# Patient Record
Sex: MALE | Race: WHITE | NOT HISPANIC OR LATINO | Employment: OTHER | URBAN - METROPOLITAN AREA
[De-identification: names, ages, dates, MRNs, and addresses within clinical notes are randomized per-mention and may not be internally consistent; named-entity substitution may affect disease eponyms.]

---

## 2020-10-13 ENCOUNTER — HOSPITAL ENCOUNTER (OUTPATIENT)
Dept: LAB | Facility: MEDICAL CENTER | Age: 75
End: 2020-10-13
Attending: INTERNAL MEDICINE
Payer: MEDICARE

## 2020-10-13 PROCEDURE — 80053 COMPREHEN METABOLIC PANEL: CPT

## 2020-10-13 PROCEDURE — 84153 ASSAY OF PSA TOTAL: CPT

## 2020-10-13 PROCEDURE — 84100 ASSAY OF PHOSPHORUS: CPT

## 2020-10-13 PROCEDURE — 83735 ASSAY OF MAGNESIUM: CPT

## 2020-10-14 LAB
ALBUMIN SERPL BCP-MCNC: 4.3 G/DL (ref 3.2–4.9)
ALBUMIN/GLOB SERPL: 1.7 G/DL
ALP SERPL-CCNC: 250 U/L (ref 30–99)
ALT SERPL-CCNC: 18 U/L (ref 2–50)
ANION GAP SERPL CALC-SCNC: 8 MMOL/L (ref 7–16)
AST SERPL-CCNC: 16 U/L (ref 12–45)
BILIRUB SERPL-MCNC: 0.7 MG/DL (ref 0.1–1.5)
BUN SERPL-MCNC: 25 MG/DL (ref 8–22)
CALCIUM SERPL-MCNC: 9.3 MG/DL (ref 8.5–10.5)
CHLORIDE SERPL-SCNC: 98 MMOL/L (ref 96–112)
CO2 SERPL-SCNC: 28 MMOL/L (ref 20–33)
CREAT SERPL-MCNC: 0.98 MG/DL (ref 0.5–1.4)
GLOBULIN SER CALC-MCNC: 2.5 G/DL (ref 1.9–3.5)
GLUCOSE SERPL-MCNC: 100 MG/DL (ref 65–99)
MAGNESIUM SERPL-MCNC: 1.8 MG/DL (ref 1.5–2.5)
PHOSPHATE SERPL-MCNC: 4.1 MG/DL (ref 2.5–4.5)
POTASSIUM SERPL-SCNC: 4.2 MMOL/L (ref 3.6–5.5)
PROT SERPL-MCNC: 6.8 G/DL (ref 6–8.2)
PSA SERPL-MCNC: 40.1 NG/ML (ref 0–4)
SODIUM SERPL-SCNC: 134 MMOL/L (ref 135–145)

## 2020-10-15 ENCOUNTER — HOSPITAL ENCOUNTER (OUTPATIENT)
Dept: RADIOLOGY | Facility: MEDICAL CENTER | Age: 75
End: 2020-10-15
Attending: INTERNAL MEDICINE
Payer: MEDICARE

## 2020-10-15 DIAGNOSIS — C61 MALIGNANT NEOPLASM OF PROSTATE (HCC): ICD-10-CM

## 2020-10-15 PROCEDURE — 700117 HCHG RX CONTRAST REV CODE 255: Performed by: INTERNAL MEDICINE

## 2020-10-15 PROCEDURE — 71260 CT THORAX DX C+: CPT

## 2020-10-15 RX ADMIN — IOHEXOL 100 ML: 350 INJECTION, SOLUTION INTRAVENOUS at 17:00

## 2020-10-15 RX ADMIN — IOHEXOL 25 ML: 240 INJECTION, SOLUTION INTRATHECAL; INTRAVASCULAR; INTRAVENOUS; ORAL at 17:00

## 2020-10-29 ENCOUNTER — HOSPITAL ENCOUNTER (OUTPATIENT)
Dept: LAB | Facility: MEDICAL CENTER | Age: 75
End: 2020-10-29
Attending: NURSE PRACTITIONER
Payer: MEDICARE

## 2020-10-29 PROCEDURE — 84153 ASSAY OF PSA TOTAL: CPT

## 2020-10-29 PROCEDURE — 80053 COMPREHEN METABOLIC PANEL: CPT

## 2020-10-29 PROCEDURE — 84100 ASSAY OF PHOSPHORUS: CPT

## 2020-10-29 PROCEDURE — 36415 COLL VENOUS BLD VENIPUNCTURE: CPT

## 2020-10-29 PROCEDURE — 83735 ASSAY OF MAGNESIUM: CPT

## 2020-10-30 LAB
ALBUMIN SERPL BCP-MCNC: 4.3 G/DL (ref 3.2–4.9)
ALBUMIN/GLOB SERPL: 1.7 G/DL
ALP SERPL-CCNC: 197 U/L (ref 30–99)
ALT SERPL-CCNC: 18 U/L (ref 2–50)
ANION GAP SERPL CALC-SCNC: 12 MMOL/L (ref 7–16)
AST SERPL-CCNC: 18 U/L (ref 12–45)
BILIRUB SERPL-MCNC: 0.9 MG/DL (ref 0.1–1.5)
BUN SERPL-MCNC: 16 MG/DL (ref 8–22)
CALCIUM SERPL-MCNC: 8.6 MG/DL (ref 8.5–10.5)
CHLORIDE SERPL-SCNC: 98 MMOL/L (ref 96–112)
CO2 SERPL-SCNC: 25 MMOL/L (ref 20–33)
CREAT SERPL-MCNC: 0.71 MG/DL (ref 0.5–1.4)
GLOBULIN SER CALC-MCNC: 2.6 G/DL (ref 1.9–3.5)
GLUCOSE SERPL-MCNC: 109 MG/DL (ref 65–99)
MAGNESIUM SERPL-MCNC: 2 MG/DL (ref 1.5–2.5)
PHOSPHATE SERPL-MCNC: 2.8 MG/DL (ref 2.5–4.5)
POTASSIUM SERPL-SCNC: 4 MMOL/L (ref 3.6–5.5)
PROT SERPL-MCNC: 6.9 G/DL (ref 6–8.2)
PSA SERPL-MCNC: 31.3 NG/ML (ref 0–4)
SODIUM SERPL-SCNC: 135 MMOL/L (ref 135–145)

## 2020-11-12 ENCOUNTER — HOSPITAL ENCOUNTER (OUTPATIENT)
Dept: LAB | Facility: MEDICAL CENTER | Age: 75
End: 2020-11-12
Attending: NURSE PRACTITIONER
Payer: MEDICARE

## 2020-11-12 LAB
ALBUMIN SERPL BCP-MCNC: 4.2 G/DL (ref 3.2–4.9)
ALBUMIN/GLOB SERPL: 1.7 G/DL
ALP SERPL-CCNC: 134 U/L (ref 30–99)
ALT SERPL-CCNC: 16 U/L (ref 2–50)
ANION GAP SERPL CALC-SCNC: 9 MMOL/L (ref 7–16)
AST SERPL-CCNC: 14 U/L (ref 12–45)
BILIRUB SERPL-MCNC: 0.7 MG/DL (ref 0.1–1.5)
BUN SERPL-MCNC: 17 MG/DL (ref 8–22)
CALCIUM SERPL-MCNC: 9.6 MG/DL (ref 8.5–10.5)
CHLORIDE SERPL-SCNC: 99 MMOL/L (ref 96–112)
CO2 SERPL-SCNC: 28 MMOL/L (ref 20–33)
CREAT SERPL-MCNC: 0.87 MG/DL (ref 0.5–1.4)
GLOBULIN SER CALC-MCNC: 2.5 G/DL (ref 1.9–3.5)
GLUCOSE SERPL-MCNC: 105 MG/DL (ref 65–99)
MAGNESIUM SERPL-MCNC: 2 MG/DL (ref 1.5–2.5)
PHOSPHATE SERPL-MCNC: 3.5 MG/DL (ref 2.5–4.5)
POTASSIUM SERPL-SCNC: 3.8 MMOL/L (ref 3.6–5.5)
PROT SERPL-MCNC: 6.7 G/DL (ref 6–8.2)
PSA SERPL-MCNC: 24.5 NG/ML (ref 0–4)
SODIUM SERPL-SCNC: 136 MMOL/L (ref 135–145)

## 2020-11-12 PROCEDURE — 84153 ASSAY OF PSA TOTAL: CPT

## 2020-11-12 PROCEDURE — 84100 ASSAY OF PHOSPHORUS: CPT

## 2020-11-12 PROCEDURE — 36415 COLL VENOUS BLD VENIPUNCTURE: CPT

## 2020-11-12 PROCEDURE — 83735 ASSAY OF MAGNESIUM: CPT

## 2020-11-12 PROCEDURE — 80053 COMPREHEN METABOLIC PANEL: CPT

## 2020-12-02 ENCOUNTER — HOSPITAL ENCOUNTER (OUTPATIENT)
Dept: LAB | Facility: MEDICAL CENTER | Age: 75
End: 2020-12-02
Attending: NURSE PRACTITIONER
Payer: MEDICARE

## 2020-12-02 LAB
ALBUMIN SERPL BCP-MCNC: 4.1 G/DL (ref 3.2–4.9)
ALBUMIN/GLOB SERPL: 1.8 G/DL
ALP SERPL-CCNC: 95 U/L (ref 30–99)
ALT SERPL-CCNC: 14 U/L (ref 2–50)
ANION GAP SERPL CALC-SCNC: 9 MMOL/L (ref 7–16)
AST SERPL-CCNC: 13 U/L (ref 12–45)
BILIRUB SERPL-MCNC: 0.7 MG/DL (ref 0.1–1.5)
BUN SERPL-MCNC: 15 MG/DL (ref 8–22)
CALCIUM SERPL-MCNC: 8.9 MG/DL (ref 8.5–10.5)
CHLORIDE SERPL-SCNC: 100 MMOL/L (ref 96–112)
CO2 SERPL-SCNC: 27 MMOL/L (ref 20–33)
CREAT SERPL-MCNC: 0.62 MG/DL (ref 0.5–1.4)
FASTING STATUS PATIENT QL REPORTED: NORMAL
GLOBULIN SER CALC-MCNC: 2.3 G/DL (ref 1.9–3.5)
GLUCOSE SERPL-MCNC: 119 MG/DL (ref 65–99)
MAGNESIUM SERPL-MCNC: 1.7 MG/DL (ref 1.5–2.5)
PHOSPHATE SERPL-MCNC: 3.2 MG/DL (ref 2.5–4.5)
POTASSIUM SERPL-SCNC: 3.9 MMOL/L (ref 3.6–5.5)
PROT SERPL-MCNC: 6.4 G/DL (ref 6–8.2)
PSA SERPL-MCNC: 20.7 NG/ML (ref 0–4)
SODIUM SERPL-SCNC: 136 MMOL/L (ref 135–145)

## 2020-12-02 PROCEDURE — 84100 ASSAY OF PHOSPHORUS: CPT

## 2020-12-02 PROCEDURE — 36415 COLL VENOUS BLD VENIPUNCTURE: CPT

## 2020-12-02 PROCEDURE — 84153 ASSAY OF PSA TOTAL: CPT

## 2020-12-02 PROCEDURE — 80053 COMPREHEN METABOLIC PANEL: CPT

## 2020-12-02 PROCEDURE — 83735 ASSAY OF MAGNESIUM: CPT

## 2020-12-16 ENCOUNTER — HOSPITAL ENCOUNTER (OUTPATIENT)
Dept: LAB | Facility: MEDICAL CENTER | Age: 75
End: 2020-12-16
Attending: NURSE PRACTITIONER
Payer: MEDICARE

## 2020-12-16 LAB
ALBUMIN SERPL BCP-MCNC: 4.5 G/DL (ref 3.2–4.9)
ALBUMIN/GLOB SERPL: 1.9 G/DL
ALP SERPL-CCNC: 85 U/L (ref 30–99)
ALT SERPL-CCNC: 15 U/L (ref 2–50)
ANION GAP SERPL CALC-SCNC: 11 MMOL/L (ref 7–16)
AST SERPL-CCNC: 14 U/L (ref 12–45)
BILIRUB SERPL-MCNC: 0.7 MG/DL (ref 0.1–1.5)
BUN SERPL-MCNC: 26 MG/DL (ref 8–22)
CALCIUM SERPL-MCNC: 9.7 MG/DL (ref 8.5–10.5)
CHLORIDE SERPL-SCNC: 101 MMOL/L (ref 96–112)
CO2 SERPL-SCNC: 27 MMOL/L (ref 20–33)
CREAT SERPL-MCNC: 0.97 MG/DL (ref 0.5–1.4)
GLOBULIN SER CALC-MCNC: 2.4 G/DL (ref 1.9–3.5)
GLUCOSE SERPL-MCNC: 99 MG/DL (ref 65–99)
MAGNESIUM SERPL-MCNC: 1.6 MG/DL (ref 1.5–2.5)
PHOSPHATE SERPL-MCNC: 3.9 MG/DL (ref 2.5–4.5)
POTASSIUM SERPL-SCNC: 3.9 MMOL/L (ref 3.6–5.5)
PROT SERPL-MCNC: 6.9 G/DL (ref 6–8.2)
SODIUM SERPL-SCNC: 139 MMOL/L (ref 135–145)

## 2020-12-16 PROCEDURE — 83735 ASSAY OF MAGNESIUM: CPT

## 2020-12-16 PROCEDURE — 84153 ASSAY OF PSA TOTAL: CPT

## 2020-12-16 PROCEDURE — 84100 ASSAY OF PHOSPHORUS: CPT

## 2020-12-16 PROCEDURE — 80053 COMPREHEN METABOLIC PANEL: CPT

## 2020-12-16 PROCEDURE — 36415 COLL VENOUS BLD VENIPUNCTURE: CPT

## 2020-12-17 LAB — PSA SERPL-MCNC: 19.3 NG/ML (ref 0–4)

## 2021-01-06 ENCOUNTER — HOSPITAL ENCOUNTER (OUTPATIENT)
Dept: LAB | Facility: MEDICAL CENTER | Age: 76
End: 2021-01-06
Attending: NURSE PRACTITIONER
Payer: MEDICARE

## 2021-01-06 LAB
ALBUMIN SERPL BCP-MCNC: 4.2 G/DL (ref 3.2–4.9)
ALBUMIN/GLOB SERPL: 1.8 G/DL
ALP SERPL-CCNC: 80 U/L (ref 30–99)
ALT SERPL-CCNC: 14 U/L (ref 2–50)
ANION GAP SERPL CALC-SCNC: 8 MMOL/L (ref 7–16)
AST SERPL-CCNC: 13 U/L (ref 12–45)
BILIRUB SERPL-MCNC: 0.8 MG/DL (ref 0.1–1.5)
BUN SERPL-MCNC: 15 MG/DL (ref 8–22)
CALCIUM SERPL-MCNC: 9.5 MG/DL (ref 8.5–10.5)
CHLORIDE SERPL-SCNC: 98 MMOL/L (ref 96–112)
CO2 SERPL-SCNC: 29 MMOL/L (ref 20–33)
CREAT SERPL-MCNC: 0.82 MG/DL (ref 0.5–1.4)
GLOBULIN SER CALC-MCNC: 2.4 G/DL (ref 1.9–3.5)
GLUCOSE SERPL-MCNC: 148 MG/DL (ref 65–99)
MAGNESIUM SERPL-MCNC: 1.8 MG/DL (ref 1.5–2.5)
PHOSPHATE SERPL-MCNC: 2.7 MG/DL (ref 2.5–4.5)
POTASSIUM SERPL-SCNC: 3.7 MMOL/L (ref 3.6–5.5)
PROT SERPL-MCNC: 6.6 G/DL (ref 6–8.2)
PSA SERPL-MCNC: 19.1 NG/ML (ref 0–4)
SODIUM SERPL-SCNC: 135 MMOL/L (ref 135–145)

## 2021-01-06 PROCEDURE — 83735 ASSAY OF MAGNESIUM: CPT

## 2021-01-06 PROCEDURE — 80053 COMPREHEN METABOLIC PANEL: CPT

## 2021-01-06 PROCEDURE — 84153 ASSAY OF PSA TOTAL: CPT

## 2021-01-06 PROCEDURE — 36415 COLL VENOUS BLD VENIPUNCTURE: CPT

## 2021-01-06 PROCEDURE — 84100 ASSAY OF PHOSPHORUS: CPT

## 2021-01-12 DIAGNOSIS — Z23 NEED FOR VACCINATION: ICD-10-CM

## 2021-01-13 ENCOUNTER — HOSPITAL ENCOUNTER (OUTPATIENT)
Dept: LAB | Facility: MEDICAL CENTER | Age: 76
End: 2021-01-13
Attending: NURSE PRACTITIONER
Payer: MEDICARE

## 2021-01-13 LAB
ALBUMIN SERPL BCP-MCNC: 4.3 G/DL (ref 3.2–4.9)
ALBUMIN/GLOB SERPL: 1.8 G/DL
ALP SERPL-CCNC: 81 U/L (ref 30–99)
ALT SERPL-CCNC: 16 U/L (ref 2–50)
ANION GAP SERPL CALC-SCNC: 9 MMOL/L (ref 7–16)
AST SERPL-CCNC: 16 U/L (ref 12–45)
BILIRUB SERPL-MCNC: 0.8 MG/DL (ref 0.1–1.5)
BUN SERPL-MCNC: 21 MG/DL (ref 8–22)
CALCIUM SERPL-MCNC: 9 MG/DL (ref 8.5–10.5)
CHLORIDE SERPL-SCNC: 100 MMOL/L (ref 96–112)
CO2 SERPL-SCNC: 29 MMOL/L (ref 20–33)
CREAT SERPL-MCNC: 0.76 MG/DL (ref 0.5–1.4)
GLOBULIN SER CALC-MCNC: 2.4 G/DL (ref 1.9–3.5)
GLUCOSE SERPL-MCNC: 116 MG/DL (ref 65–99)
MAGNESIUM SERPL-MCNC: 1.7 MG/DL (ref 1.5–2.5)
PHOSPHATE SERPL-MCNC: 3.6 MG/DL (ref 2.5–4.5)
POTASSIUM SERPL-SCNC: 4.2 MMOL/L (ref 3.6–5.5)
PROT SERPL-MCNC: 6.7 G/DL (ref 6–8.2)
PSA SERPL-MCNC: 18.8 NG/ML (ref 0–4)
SODIUM SERPL-SCNC: 138 MMOL/L (ref 135–145)

## 2021-01-13 PROCEDURE — 36415 COLL VENOUS BLD VENIPUNCTURE: CPT

## 2021-01-13 PROCEDURE — 84153 ASSAY OF PSA TOTAL: CPT

## 2021-01-13 PROCEDURE — 83735 ASSAY OF MAGNESIUM: CPT

## 2021-01-13 PROCEDURE — 84100 ASSAY OF PHOSPHORUS: CPT

## 2021-01-13 PROCEDURE — 80053 COMPREHEN METABOLIC PANEL: CPT

## 2021-02-10 ENCOUNTER — IMMUNIZATION (OUTPATIENT)
Dept: FAMILY PLANNING/WOMEN'S HEALTH CLINIC | Facility: IMMUNIZATION CENTER | Age: 76
End: 2021-02-10
Attending: INTERNAL MEDICINE
Payer: MEDICARE

## 2021-02-10 DIAGNOSIS — Z23 ENCOUNTER FOR VACCINATION: Primary | ICD-10-CM

## 2021-02-10 DIAGNOSIS — Z23 NEED FOR VACCINATION: ICD-10-CM

## 2021-02-10 PROCEDURE — 91300 PFIZER SARS-COV-2 VACCINE: CPT | Performed by: INTERNAL MEDICINE

## 2021-02-10 PROCEDURE — 0001A PFIZER SARS-COV-2 VACCINE: CPT | Performed by: INTERNAL MEDICINE

## 2021-03-03 ENCOUNTER — HOSPITAL ENCOUNTER (OUTPATIENT)
Dept: LAB | Facility: MEDICAL CENTER | Age: 76
End: 2021-03-03
Attending: NURSE PRACTITIONER
Payer: MEDICARE

## 2021-03-03 LAB
ALBUMIN SERPL BCP-MCNC: 3.9 G/DL (ref 3.2–4.9)
ALBUMIN/GLOB SERPL: 1.4 G/DL
ALP SERPL-CCNC: 66 U/L (ref 30–99)
ALT SERPL-CCNC: 16 U/L (ref 2–50)
ANION GAP SERPL CALC-SCNC: 11 MMOL/L (ref 7–16)
AST SERPL-CCNC: 20 U/L (ref 12–45)
BILIRUB SERPL-MCNC: 0.7 MG/DL (ref 0.1–1.5)
BUN SERPL-MCNC: 11 MG/DL (ref 8–22)
CALCIUM SERPL-MCNC: 9.2 MG/DL (ref 8.5–10.5)
CHLORIDE SERPL-SCNC: 97 MMOL/L (ref 96–112)
CO2 SERPL-SCNC: 27 MMOL/L (ref 20–33)
CREAT SERPL-MCNC: 0.69 MG/DL (ref 0.5–1.4)
GLOBULIN SER CALC-MCNC: 2.7 G/DL (ref 1.9–3.5)
GLUCOSE SERPL-MCNC: 129 MG/DL (ref 65–99)
MAGNESIUM SERPL-MCNC: 1.8 MG/DL (ref 1.5–2.5)
PHOSPHATE SERPL-MCNC: 2.7 MG/DL (ref 2.5–4.5)
POTASSIUM SERPL-SCNC: 3.8 MMOL/L (ref 3.6–5.5)
PROT SERPL-MCNC: 6.6 G/DL (ref 6–8.2)
PSA SERPL-MCNC: 21.3 NG/ML (ref 0–4)
SODIUM SERPL-SCNC: 135 MMOL/L (ref 135–145)

## 2021-03-03 PROCEDURE — 84153 ASSAY OF PSA TOTAL: CPT

## 2021-03-03 PROCEDURE — 36415 COLL VENOUS BLD VENIPUNCTURE: CPT

## 2021-03-03 PROCEDURE — 84100 ASSAY OF PHOSPHORUS: CPT

## 2021-03-03 PROCEDURE — 83735 ASSAY OF MAGNESIUM: CPT

## 2021-03-03 PROCEDURE — 80053 COMPREHEN METABOLIC PANEL: CPT

## 2021-03-10 ENCOUNTER — IMMUNIZATION (OUTPATIENT)
Dept: FAMILY PLANNING/WOMEN'S HEALTH CLINIC | Facility: IMMUNIZATION CENTER | Age: 76
End: 2021-03-10
Attending: INTERNAL MEDICINE
Payer: MEDICARE

## 2021-03-10 DIAGNOSIS — Z23 ENCOUNTER FOR VACCINATION: Primary | ICD-10-CM

## 2021-03-10 PROCEDURE — 91300 PFIZER SARS-COV-2 VACCINE: CPT | Performed by: NURSE PRACTITIONER

## 2021-03-10 PROCEDURE — 0002A PFIZER SARS-COV-2 VACCINE: CPT | Performed by: NURSE PRACTITIONER

## 2021-03-31 ENCOUNTER — HOSPITAL ENCOUNTER (OUTPATIENT)
Dept: LAB | Facility: MEDICAL CENTER | Age: 76
End: 2021-03-31
Attending: NURSE PRACTITIONER
Payer: MEDICARE

## 2021-03-31 LAB
ALBUMIN SERPL BCP-MCNC: 4.2 G/DL (ref 3.2–4.9)
ALBUMIN/GLOB SERPL: 1.8 G/DL
ALP SERPL-CCNC: 63 U/L (ref 30–99)
ALT SERPL-CCNC: 16 U/L (ref 2–50)
ANION GAP SERPL CALC-SCNC: 9 MMOL/L (ref 7–16)
AST SERPL-CCNC: 19 U/L (ref 12–45)
BILIRUB SERPL-MCNC: 0.5 MG/DL (ref 0.1–1.5)
BUN SERPL-MCNC: 15 MG/DL (ref 8–22)
CALCIUM SERPL-MCNC: 9.1 MG/DL (ref 8.5–10.5)
CHLORIDE SERPL-SCNC: 98 MMOL/L (ref 96–112)
CO2 SERPL-SCNC: 26 MMOL/L (ref 20–33)
CREAT SERPL-MCNC: 0.59 MG/DL (ref 0.5–1.4)
GLOBULIN SER CALC-MCNC: 2.3 G/DL (ref 1.9–3.5)
GLUCOSE SERPL-MCNC: 156 MG/DL (ref 65–99)
MAGNESIUM SERPL-MCNC: 1.7 MG/DL (ref 1.5–2.5)
PHOSPHATE SERPL-MCNC: 3.2 MG/DL (ref 2.5–4.5)
POTASSIUM SERPL-SCNC: 4.1 MMOL/L (ref 3.6–5.5)
PROT SERPL-MCNC: 6.5 G/DL (ref 6–8.2)
PSA SERPL-MCNC: 20.5 NG/ML (ref 0–4)
SODIUM SERPL-SCNC: 133 MMOL/L (ref 135–145)

## 2021-03-31 PROCEDURE — 80053 COMPREHEN METABOLIC PANEL: CPT

## 2021-03-31 PROCEDURE — 84153 ASSAY OF PSA TOTAL: CPT

## 2021-03-31 PROCEDURE — 83735 ASSAY OF MAGNESIUM: CPT

## 2021-03-31 PROCEDURE — 84100 ASSAY OF PHOSPHORUS: CPT

## 2021-03-31 PROCEDURE — 36415 COLL VENOUS BLD VENIPUNCTURE: CPT

## 2021-04-29 ENCOUNTER — HOSPITAL ENCOUNTER (OUTPATIENT)
Dept: LAB | Facility: MEDICAL CENTER | Age: 76
End: 2021-04-29
Attending: INTERNAL MEDICINE
Payer: MEDICARE

## 2021-04-29 ENCOUNTER — APPOINTMENT (OUTPATIENT)
Dept: LAB | Facility: MEDICAL CENTER | Age: 76
End: 2021-04-29
Attending: NURSE PRACTITIONER
Payer: MEDICARE

## 2021-04-29 LAB
ALBUMIN SERPL BCP-MCNC: 3.9 G/DL (ref 3.2–4.9)
ALBUMIN/GLOB SERPL: 1.4 G/DL
ALP SERPL-CCNC: 77 U/L (ref 30–99)
ALT SERPL-CCNC: 15 U/L (ref 2–50)
ANION GAP SERPL CALC-SCNC: 9 MMOL/L (ref 7–16)
AST SERPL-CCNC: 14 U/L (ref 12–45)
BASOPHILS # BLD AUTO: 0.8 % (ref 0–1.8)
BASOPHILS # BLD: 0.05 K/UL (ref 0–0.12)
BILIRUB SERPL-MCNC: 0.7 MG/DL (ref 0.1–1.5)
BUN SERPL-MCNC: 16 MG/DL (ref 8–22)
CALCIUM SERPL-MCNC: 9.1 MG/DL (ref 8.5–10.5)
CHLORIDE SERPL-SCNC: 98 MMOL/L (ref 96–112)
CO2 SERPL-SCNC: 28 MMOL/L (ref 20–33)
CREAT SERPL-MCNC: 0.82 MG/DL (ref 0.5–1.4)
EOSINOPHIL # BLD AUTO: 0.13 K/UL (ref 0–0.51)
EOSINOPHIL NFR BLD: 2.2 % (ref 0–6.9)
ERYTHROCYTE [DISTWIDTH] IN BLOOD BY AUTOMATED COUNT: 43.6 FL (ref 35.9–50)
GLOBULIN SER CALC-MCNC: 2.7 G/DL (ref 1.9–3.5)
GLUCOSE SERPL-MCNC: 103 MG/DL (ref 65–99)
HCT VFR BLD AUTO: 39 % (ref 42–52)
HGB BLD-MCNC: 13.3 G/DL (ref 14–18)
IMM GRANULOCYTES # BLD AUTO: 0.02 K/UL (ref 0–0.11)
IMM GRANULOCYTES NFR BLD AUTO: 0.3 % (ref 0–0.9)
LYMPHOCYTES # BLD AUTO: 1.23 K/UL (ref 1–4.8)
LYMPHOCYTES NFR BLD: 20.4 % (ref 22–41)
MAGNESIUM SERPL-MCNC: 1.7 MG/DL (ref 1.5–2.5)
MCH RBC QN AUTO: 33.8 PG (ref 27–33)
MCHC RBC AUTO-ENTMCNC: 34.1 G/DL (ref 33.7–35.3)
MCV RBC AUTO: 99 FL (ref 81.4–97.8)
MONOCYTES # BLD AUTO: 0.54 K/UL (ref 0–0.85)
MONOCYTES NFR BLD AUTO: 9 % (ref 0–13.4)
NEUTROPHILS # BLD AUTO: 4.06 K/UL (ref 1.82–7.42)
NEUTROPHILS NFR BLD: 67.3 % (ref 44–72)
NRBC # BLD AUTO: 0 K/UL
NRBC BLD-RTO: 0 /100 WBC
PHOSPHATE SERPL-MCNC: 3.5 MG/DL (ref 2.5–4.5)
PLATELET # BLD AUTO: 222 K/UL (ref 164–446)
PMV BLD AUTO: 10.3 FL (ref 9–12.9)
POTASSIUM SERPL-SCNC: 4.2 MMOL/L (ref 3.6–5.5)
PROT SERPL-MCNC: 6.6 G/DL (ref 6–8.2)
PSA SERPL-MCNC: 24.3 NG/ML (ref 0–4)
RBC # BLD AUTO: 3.94 M/UL (ref 4.7–6.1)
SODIUM SERPL-SCNC: 135 MMOL/L (ref 135–145)
WBC # BLD AUTO: 6 K/UL (ref 4.8–10.8)

## 2021-04-29 PROCEDURE — 36415 COLL VENOUS BLD VENIPUNCTURE: CPT

## 2021-04-29 PROCEDURE — 84100 ASSAY OF PHOSPHORUS: CPT

## 2021-04-29 PROCEDURE — 80053 COMPREHEN METABOLIC PANEL: CPT

## 2021-04-29 PROCEDURE — 84153 ASSAY OF PSA TOTAL: CPT

## 2021-04-29 PROCEDURE — 83735 ASSAY OF MAGNESIUM: CPT

## 2021-04-29 PROCEDURE — 85025 COMPLETE CBC W/AUTO DIFF WBC: CPT

## 2021-05-25 ENCOUNTER — HOSPITAL ENCOUNTER (OUTPATIENT)
Dept: LAB | Facility: MEDICAL CENTER | Age: 76
End: 2021-05-25
Attending: NURSE PRACTITIONER
Payer: MEDICARE

## 2021-05-25 LAB
ALBUMIN SERPL BCP-MCNC: 4.1 G/DL (ref 3.2–4.9)
ALBUMIN/GLOB SERPL: 1.6 G/DL
ALP SERPL-CCNC: 63 U/L (ref 30–99)
ALT SERPL-CCNC: 17 U/L (ref 2–50)
ANION GAP SERPL CALC-SCNC: 11 MMOL/L (ref 7–16)
AST SERPL-CCNC: 9 U/L (ref 12–45)
BILIRUB SERPL-MCNC: 0.9 MG/DL (ref 0.1–1.5)
BUN SERPL-MCNC: 14 MG/DL (ref 8–22)
CALCIUM SERPL-MCNC: 8.7 MG/DL (ref 8.5–10.5)
CHLORIDE SERPL-SCNC: 95 MMOL/L (ref 96–112)
CO2 SERPL-SCNC: 24 MMOL/L (ref 20–33)
CREAT SERPL-MCNC: 0.67 MG/DL (ref 0.5–1.4)
FASTING STATUS PATIENT QL REPORTED: NORMAL
GLOBULIN SER CALC-MCNC: 2.6 G/DL (ref 1.9–3.5)
GLUCOSE SERPL-MCNC: 109 MG/DL (ref 65–99)
MAGNESIUM SERPL-MCNC: 1.8 MG/DL (ref 1.5–2.5)
PHOSPHATE SERPL-MCNC: 3.3 MG/DL (ref 2.5–4.5)
POTASSIUM SERPL-SCNC: 4.2 MMOL/L (ref 3.6–5.5)
PROT SERPL-MCNC: 6.7 G/DL (ref 6–8.2)
PSA SERPL-MCNC: 25.8 NG/ML (ref 0–4)
SODIUM SERPL-SCNC: 130 MMOL/L (ref 135–145)

## 2021-05-25 PROCEDURE — 36415 COLL VENOUS BLD VENIPUNCTURE: CPT

## 2021-05-25 PROCEDURE — 84100 ASSAY OF PHOSPHORUS: CPT

## 2021-05-25 PROCEDURE — 83735 ASSAY OF MAGNESIUM: CPT

## 2021-05-25 PROCEDURE — 84153 ASSAY OF PSA TOTAL: CPT

## 2021-05-25 PROCEDURE — 80053 COMPREHEN METABOLIC PANEL: CPT

## 2021-07-07 ENCOUNTER — HOSPITAL ENCOUNTER (OUTPATIENT)
Dept: LAB | Facility: MEDICAL CENTER | Age: 76
End: 2021-07-07
Attending: NURSE PRACTITIONER
Payer: MEDICARE

## 2021-07-07 LAB
ALBUMIN SERPL BCP-MCNC: 3.9 G/DL (ref 3.2–4.9)
ALBUMIN/GLOB SERPL: 1.6 G/DL
ALP SERPL-CCNC: 62 U/L (ref 30–99)
ALT SERPL-CCNC: 12 U/L (ref 2–50)
ANION GAP SERPL CALC-SCNC: 13 MMOL/L (ref 7–16)
AST SERPL-CCNC: 11 U/L (ref 12–45)
BILIRUB SERPL-MCNC: 0.7 MG/DL (ref 0.1–1.5)
BUN SERPL-MCNC: 11 MG/DL (ref 8–22)
CALCIUM SERPL-MCNC: 8.9 MG/DL (ref 8.5–10.5)
CHLORIDE SERPL-SCNC: 96 MMOL/L (ref 96–112)
CO2 SERPL-SCNC: 26 MMOL/L (ref 20–33)
CREAT SERPL-MCNC: 0.74 MG/DL (ref 0.5–1.4)
GLOBULIN SER CALC-MCNC: 2.4 G/DL (ref 1.9–3.5)
GLUCOSE SERPL-MCNC: 117 MG/DL (ref 65–99)
MAGNESIUM SERPL-MCNC: 1.5 MG/DL (ref 1.5–2.5)
PHOSPHATE SERPL-MCNC: 3.7 MG/DL (ref 2.5–4.5)
POTASSIUM SERPL-SCNC: 3.7 MMOL/L (ref 3.6–5.5)
PROT SERPL-MCNC: 6.3 G/DL (ref 6–8.2)
PSA SERPL-MCNC: 30.8 NG/ML (ref 0–4)
SODIUM SERPL-SCNC: 135 MMOL/L (ref 135–145)

## 2021-07-07 PROCEDURE — 84153 ASSAY OF PSA TOTAL: CPT

## 2021-07-07 PROCEDURE — 36415 COLL VENOUS BLD VENIPUNCTURE: CPT

## 2021-07-07 PROCEDURE — 84100 ASSAY OF PHOSPHORUS: CPT

## 2021-07-07 PROCEDURE — 83735 ASSAY OF MAGNESIUM: CPT

## 2021-07-07 PROCEDURE — 80053 COMPREHEN METABOLIC PANEL: CPT

## 2021-07-09 ENCOUNTER — HOSPITAL ENCOUNTER (OUTPATIENT)
Dept: RADIOLOGY | Facility: MEDICAL CENTER | Age: 76
End: 2021-07-09
Attending: NURSE PRACTITIONER
Payer: MEDICARE

## 2021-07-09 DIAGNOSIS — C61 MALIGNANT NEOPLASM OF PROSTATE (HCC): ICD-10-CM

## 2021-07-09 PROCEDURE — 71046 X-RAY EXAM CHEST 2 VIEWS: CPT

## 2021-07-29 ENCOUNTER — HOSPITAL ENCOUNTER (OUTPATIENT)
Dept: RADIOLOGY | Facility: MEDICAL CENTER | Age: 76
End: 2021-07-29
Attending: INTERNAL MEDICINE
Payer: MEDICARE

## 2021-07-29 DIAGNOSIS — C61 MALIGNANT NEOPLASM OF PROSTATE (HCC): ICD-10-CM

## 2021-07-29 PROCEDURE — 700117 HCHG RX CONTRAST REV CODE 255: Performed by: INTERNAL MEDICINE

## 2021-07-29 PROCEDURE — 71260 CT THORAX DX C+: CPT | Mod: MH

## 2021-07-29 RX ADMIN — IOHEXOL 100 ML: 350 INJECTION, SOLUTION INTRAVENOUS at 11:00

## 2021-07-29 RX ADMIN — IOHEXOL 25 ML: 240 INJECTION, SOLUTION INTRATHECAL; INTRAVASCULAR; INTRAVENOUS; ORAL at 09:55

## 2021-08-03 ENCOUNTER — HOSPITAL ENCOUNTER (OUTPATIENT)
Dept: LAB | Facility: MEDICAL CENTER | Age: 76
End: 2021-08-03
Attending: NURSE PRACTITIONER
Payer: MEDICARE

## 2021-08-03 LAB
ALBUMIN SERPL BCP-MCNC: 4.1 G/DL (ref 3.2–4.9)
ALBUMIN/GLOB SERPL: 1.5 G/DL
ALP SERPL-CCNC: 64 U/L (ref 30–99)
ALT SERPL-CCNC: 11 U/L (ref 2–50)
ANION GAP SERPL CALC-SCNC: 13 MMOL/L (ref 7–16)
AST SERPL-CCNC: 12 U/L (ref 12–45)
BILIRUB SERPL-MCNC: 0.8 MG/DL (ref 0.1–1.5)
BUN SERPL-MCNC: 18 MG/DL (ref 8–22)
CALCIUM SERPL-MCNC: 9.1 MG/DL (ref 8.5–10.5)
CHLORIDE SERPL-SCNC: 94 MMOL/L (ref 96–112)
CO2 SERPL-SCNC: 24 MMOL/L (ref 20–33)
CREAT SERPL-MCNC: 0.73 MG/DL (ref 0.5–1.4)
FASTING STATUS PATIENT QL REPORTED: NORMAL
GLOBULIN SER CALC-MCNC: 2.7 G/DL (ref 1.9–3.5)
GLUCOSE SERPL-MCNC: 149 MG/DL (ref 65–99)
MAGNESIUM SERPL-MCNC: 1.8 MG/DL (ref 1.5–2.5)
PHOSPHATE SERPL-MCNC: 3.2 MG/DL (ref 2.5–4.5)
POTASSIUM SERPL-SCNC: 3.8 MMOL/L (ref 3.6–5.5)
PROT SERPL-MCNC: 6.8 G/DL (ref 6–8.2)
PSA SERPL-MCNC: 40.8 NG/ML (ref 0–4)
SODIUM SERPL-SCNC: 131 MMOL/L (ref 135–145)

## 2021-08-03 PROCEDURE — 84100 ASSAY OF PHOSPHORUS: CPT

## 2021-08-03 PROCEDURE — 36415 COLL VENOUS BLD VENIPUNCTURE: CPT

## 2021-08-03 PROCEDURE — 83735 ASSAY OF MAGNESIUM: CPT

## 2021-08-03 PROCEDURE — 84153 ASSAY OF PSA TOTAL: CPT

## 2021-08-03 PROCEDURE — 80053 COMPREHEN METABOLIC PANEL: CPT

## 2021-08-26 ENCOUNTER — HOSPITAL ENCOUNTER (OUTPATIENT)
Dept: RADIOLOGY | Facility: MEDICAL CENTER | Age: 76
End: 2021-08-26
Attending: INTERNAL MEDICINE
Payer: MEDICARE

## 2021-08-26 DIAGNOSIS — C61 MALIGNANT NEOPLASM OF PROSTATE (HCC): ICD-10-CM

## 2021-08-26 PROCEDURE — A9503 TC99M MEDRONATE: HCPCS

## 2021-08-31 ENCOUNTER — OFFICE VISIT (OUTPATIENT)
Dept: MEDICAL GROUP | Facility: LAB | Age: 76
End: 2021-08-31

## 2021-08-31 ENCOUNTER — HOSPITAL ENCOUNTER (OUTPATIENT)
Dept: LAB | Facility: MEDICAL CENTER | Age: 76
End: 2021-08-31
Attending: NURSE PRACTITIONER
Payer: MEDICARE

## 2021-08-31 ENCOUNTER — TELEPHONE (OUTPATIENT)
Dept: MEDICAL GROUP | Facility: LAB | Age: 76
End: 2021-08-31

## 2021-08-31 VITALS
OXYGEN SATURATION: 94 % | DIASTOLIC BLOOD PRESSURE: 64 MMHG | WEIGHT: 254 LBS | SYSTOLIC BLOOD PRESSURE: 136 MMHG | BODY MASS INDEX: 40.82 KG/M2 | HEART RATE: 62 BPM | HEIGHT: 66 IN | TEMPERATURE: 96.3 F | RESPIRATION RATE: 16 BRPM

## 2021-08-31 DIAGNOSIS — E78.5 HYPERLIPIDEMIA, UNSPECIFIED HYPERLIPIDEMIA TYPE: ICD-10-CM

## 2021-08-31 DIAGNOSIS — R60.0 BILATERAL LOWER EXTREMITY EDEMA: ICD-10-CM

## 2021-08-31 DIAGNOSIS — N40.1 BENIGN PROSTATIC HYPERPLASIA WITH LOWER URINARY TRACT SYMPTOMS, SYMPTOM DETAILS UNSPECIFIED: ICD-10-CM

## 2021-08-31 DIAGNOSIS — R06.02 SOB (SHORTNESS OF BREATH) ON EXERTION: ICD-10-CM

## 2021-08-31 DIAGNOSIS — M10.9 GOUT, UNSPECIFIED CAUSE, UNSPECIFIED CHRONICITY, UNSPECIFIED SITE: ICD-10-CM

## 2021-08-31 DIAGNOSIS — R06.09 DYSPNEA ON EXERTION: ICD-10-CM

## 2021-08-31 DIAGNOSIS — K59.09 OTHER CONSTIPATION: ICD-10-CM

## 2021-08-31 DIAGNOSIS — C61 MALIGNANT NEOPLASM OF PROSTATE (HCC): ICD-10-CM

## 2021-08-31 DIAGNOSIS — I10 ESSENTIAL HYPERTENSION: ICD-10-CM

## 2021-08-31 PROBLEM — R73.03 PREDIABETES: Status: ACTIVE | Noted: 2021-08-31

## 2021-08-31 PROBLEM — K57.90 DIVERTICULAR DISEASE: Status: ACTIVE | Noted: 2021-08-31

## 2021-08-31 PROBLEM — N40.0 BENIGN PROSTATIC HYPERPLASIA: Status: ACTIVE | Noted: 2021-08-31

## 2021-08-31 LAB
ALBUMIN SERPL BCP-MCNC: 4 G/DL (ref 3.2–4.9)
ALBUMIN/GLOB SERPL: 1.7 G/DL
ALP SERPL-CCNC: 75 U/L (ref 30–99)
ALT SERPL-CCNC: 12 U/L (ref 2–50)
ANION GAP SERPL CALC-SCNC: 13 MMOL/L (ref 7–16)
AST SERPL-CCNC: 14 U/L (ref 12–45)
BILIRUB SERPL-MCNC: 0.8 MG/DL (ref 0.1–1.5)
BUN SERPL-MCNC: 14 MG/DL (ref 8–22)
CALCIUM SERPL-MCNC: 8.9 MG/DL (ref 8.5–10.5)
CHLORIDE SERPL-SCNC: 95 MMOL/L (ref 96–112)
CO2 SERPL-SCNC: 24 MMOL/L (ref 20–33)
CREAT SERPL-MCNC: 0.67 MG/DL (ref 0.5–1.4)
GLOBULIN SER CALC-MCNC: 2.4 G/DL (ref 1.9–3.5)
GLUCOSE SERPL-MCNC: 146 MG/DL (ref 65–99)
MAGNESIUM SERPL-MCNC: 1.8 MG/DL (ref 1.5–2.5)
PHOSPHATE SERPL-MCNC: 2.6 MG/DL (ref 2.5–4.5)
POTASSIUM SERPL-SCNC: 3.8 MMOL/L (ref 3.6–5.5)
PROT SERPL-MCNC: 6.4 G/DL (ref 6–8.2)
PSA SERPL-MCNC: 55.5 NG/ML (ref 0–4)
SODIUM SERPL-SCNC: 132 MMOL/L (ref 135–145)

## 2021-08-31 PROCEDURE — 80053 COMPREHEN METABOLIC PANEL: CPT

## 2021-08-31 PROCEDURE — 84153 ASSAY OF PSA TOTAL: CPT

## 2021-08-31 PROCEDURE — 99204 OFFICE O/P NEW MOD 45 MIN: CPT | Performed by: NURSE PRACTITIONER

## 2021-08-31 PROCEDURE — 84100 ASSAY OF PHOSPHORUS: CPT

## 2021-08-31 PROCEDURE — 83735 ASSAY OF MAGNESIUM: CPT

## 2021-08-31 PROCEDURE — 36415 COLL VENOUS BLD VENIPUNCTURE: CPT

## 2021-08-31 RX ORDER — CHOLECALCIFEROL (VITAMIN D3) 125 MCG
2000 CAPSULE ORAL DAILY
Status: ON HOLD | COMMUNITY
End: 2022-05-24 | Stop reason: SDUPTHER

## 2021-08-31 RX ORDER — TAMSULOSIN HYDROCHLORIDE 0.4 MG/1
CAPSULE ORAL
Qty: 90 CAPSULE | Refills: 3 | Status: SHIPPED | OUTPATIENT
Start: 2021-08-31 | End: 2022-01-11

## 2021-08-31 RX ORDER — HYDROCHLOROTHIAZIDE 50 MG/1
50 TABLET ORAL
COMMUNITY
Start: 2021-06-15 | End: 2021-08-31

## 2021-08-31 RX ORDER — LOSARTAN POTASSIUM 100 MG/1
100 TABLET ORAL DAILY
Qty: 90 TABLET | Refills: 3 | Status: ON HOLD | OUTPATIENT
Start: 2021-08-31 | End: 2022-05-24 | Stop reason: SDUPTHER

## 2021-08-31 RX ORDER — ALBUTEROL SULFATE 90 UG/1
AEROSOL, METERED RESPIRATORY (INHALATION)
Qty: 8.5 G | Refills: 3 | Status: SHIPPED | OUTPATIENT
Start: 2021-08-31 | End: 2022-05-21

## 2021-08-31 RX ORDER — ALLOPURINOL 300 MG/1
300 TABLET ORAL
COMMUNITY
Start: 2021-06-15 | End: 2021-08-31 | Stop reason: SDUPTHER

## 2021-08-31 RX ORDER — ROSUVASTATIN CALCIUM 5 MG/1
5 TABLET, COATED ORAL DAILY
Qty: 90 TABLET | Refills: 1 | Status: SHIPPED | OUTPATIENT
Start: 2021-08-31 | End: 2022-04-15 | Stop reason: SDUPTHER

## 2021-08-31 RX ORDER — METOPROLOL TARTRATE 100 MG/1
150 TABLET ORAL 2 TIMES DAILY
Qty: 180 TABLET | Refills: 3 | Status: SHIPPED | OUTPATIENT
Start: 2021-08-31 | End: 2022-05-21

## 2021-08-31 RX ORDER — METOPROLOL TARTRATE 100 MG/1
150 TABLET ORAL
COMMUNITY
Start: 2021-05-20 | End: 2021-08-31 | Stop reason: SDUPTHER

## 2021-08-31 RX ORDER — LOSARTAN POTASSIUM 100 MG/1
100 TABLET ORAL
COMMUNITY
Start: 2021-06-12 | End: 2021-08-31 | Stop reason: SDUPTHER

## 2021-08-31 RX ORDER — FUROSEMIDE 20 MG/1
20-40 TABLET ORAL DAILY
Qty: 60 TABLET | Refills: 4 | Status: SHIPPED | OUTPATIENT
Start: 2021-08-31 | End: 2022-05-21

## 2021-08-31 RX ORDER — ROSUVASTATIN CALCIUM 5 MG/1
5 TABLET, COATED ORAL
COMMUNITY
Start: 2021-02-19 | End: 2021-08-31 | Stop reason: SDUPTHER

## 2021-08-31 RX ORDER — TAMSULOSIN HYDROCHLORIDE 0.4 MG/1
CAPSULE ORAL
COMMUNITY
Start: 2021-06-15 | End: 2021-08-31 | Stop reason: SDUPTHER

## 2021-08-31 RX ORDER — ALBUTEROL SULFATE 90 UG/1
AEROSOL, METERED RESPIRATORY (INHALATION)
COMMUNITY
Start: 2021-07-09 | End: 2021-08-31 | Stop reason: SDUPTHER

## 2021-08-31 RX ORDER — ALLOPURINOL 300 MG/1
300 TABLET ORAL DAILY
Qty: 90 TABLET | Refills: 3 | Status: SHIPPED | OUTPATIENT
Start: 2021-08-31

## 2021-08-31 RX ORDER — POTASSIUM CHLORIDE 750 MG/1
10 TABLET, FILM COATED, EXTENDED RELEASE ORAL DAILY
Qty: 90 TABLET | Refills: 1 | Status: SHIPPED | OUTPATIENT
Start: 2021-08-31 | End: 2022-04-15 | Stop reason: SDUPTHER

## 2021-08-31 RX ORDER — ASPIRIN 81 MG/1
TABLET ORAL
COMMUNITY
Start: 2021-06-24 | End: 2022-05-21

## 2021-08-31 ASSESSMENT — PATIENT HEALTH QUESTIONNAIRE - PHQ9: CLINICAL INTERPRETATION OF PHQ2 SCORE: 0

## 2021-08-31 ASSESSMENT — FIBROSIS 4 INDEX: FIB4 SCORE: 1.25

## 2021-08-31 NOTE — LETTER
[facility]  Domitila Marquez, A.P.N.  [address] [address]  [place] [address]  Fax: [phone]   Authorization for Release/Disclosure of   Protected Health Information   Name: DAVID KOHLER JR. : 1945 SSN: [id number]   Address: [address] [address] Phone:    [phone] (home)    I authorize the entity listed below to release/disclose the PHI below to:   [facility]/Domitila Marquze, A.P.N. and Domitila Marquez, A.P.N.   Provider or Entity Name:  Pedro Barry   Address   City, State, [facility]   Phone:      Fax [phone]:     Reason for request: continuity of care   Information to be released:    [  ] LAST COLONOSCOPY,  including any PATH REPORT and follow-up  [  ] LAST FIT/COLOGUARD RESULT [  ] LAST DEXA  [  ] LAST MAMMOGRAM  [  ] LAST PAP  [  ] LAST LABS [  ] RETINA EXAM REPORT  [  ] IMMUNIZATION RECORDS  [XX  ] Release all info      [  ] Check here and initial the line next to each item to release ALL health information INCLUDING  _____ Care and treatment for drug and / or alcohol abuse  _____ HIV testing, infection status, or AIDS  _____ Genetic Testing    DATES OF SERVICE OR TIME PERIOD TO BE DISCLOSED: _____________  I understand and acknowledge that:  * This Authorization may be revoked at any time by you in writing, except if your health information has already been used or disclosed.  * Your health information that will be used or disclosed as a result of you signing this authorization could be re-disclosed by the recipient. If this occurs, your re-disclosed health information may no longer be protected by State or Federal laws.  * You may refuse to sign this Authorization. Your refusal will not affect your ability to obtain treatment.  * This Authorization becomes effective upon signing and will  on (date) __________.      If no date is indicated, this Authorization will  one (1) year from the signature date.    Name: David Kohler  .    Signature: CONTINUITY OF CARE   Date:     8/31/2021       PLEASE FAX REQUESTED RECORDS BACK TO: [phone]

## 2021-08-31 NOTE — ASSESSMENT & PLAN NOTE
Chronic issue most of his adult life.  Taking losartan 100 mg, metoprolol 100 mg bid and hctz 50 mg daily.

## 2021-08-31 NOTE — ASSESSMENT & PLAN NOTE
Chronic issue for pt.  Prostate CA dx 9/2020 - dx stage 4.  Went through oral chemo and changing to IV chemo this Thursday - port placed yesterday.  Denies being in pain.  Cared for by Dr Cohen.  Has labs monthly - did last labs 2 hours ago.

## 2021-08-31 NOTE — PROGRESS NOTES
CC  Establish care    GREGORIO  Wilfredo is a 76-year-old male, new patient to our office today.  Unfortunately diagnosed with metastatic prostate cancer, stage IV, last fall.  He is now living in [place] with his daughter, was previously living near [place], [place].  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  Stopped smoking in his 30s, smoked cigars for about 10 years.  Was previously drinking several drinks per night but over the past few weeks, alcohol has not been enticing.  See medication list below.  Only surgical history is a vasectomy.  See family history below.  Fortunately overall he is feeling really well with the exception of a heavy leg sensation and swelling in his lower extremities for the past few months.  He is also newly using albuterol because of shortness of breath on exertion and albuterol is helpful.  He is scheduled for an echocardiogram tomorrow.  Had a negative chest x-ray and CT scan of his lungs in July.    #1-malignant neoplasm of prostate (HCC) - mets to bone - Dr. Cohen  Prostate CA dx many years ago but unfortunately in September 2020 returned with noted metastases to his bones.  Went through oral chemo and changing to IV chemo this Thursday - port placed yesterday.  Denies being in pain.  Cared for by Dr Cohen.  Has labs monthly - did last labs 2 hours ago.     #2-hypertension  Chronic issue most of his adult life.  Taking losartan 100 mg, metoprolol 100 mg bid and hctz 50 mg daily.  Denies chest pain.  Has never had a heart attack or stroke.    #3-gout  Chronic issue with very rare gout outbreak while taking allopurinol.      #4-other constipation  New issue for the past few months, states that prior to he was having a regular bowel movement on a daily basis.  Is taking 2 unknown pills for constipation that does not seem to work.  Describes his stool as hard, infrequent and occurring approximately every other day.    Family History   Problem Relation  "Age of Onset   • Alzheimer's Disease Mother    • Cancer Brother         kidney     Past Surgical History:   Procedure Laterality Date   • VASECTOMY         Current Outpatient Medications:   •  aspirin,  Refills: 0, Maintenance, Taking  •  Vitamin B12, Take  by mouth., Taking  •  QUERCETIN PO, Take  by mouth., Taking  •  Vitamin D3, Take  by mouth., Taking  •  folic acid, Take  by mouth., Taking  •  TURMERIC PO, Take  by mouth., Taking  •  losartan, 100 mg, Oral, DAILY  •  metoprolol tartrate, 150 mg, Oral, BID  •  furosemide, 20-40 mg, Oral, DAILY  •  potassium chloride ER, 10 mEq, Oral, DAILY  •  allopurinol, 300 mg, Oral, DAILY  •  tamsulosin, TAKE 1 CAPSULE BY MOUTH EVERY DAY AT BEDTIME  •  rosuvastatin, 5 mg, Oral, DAILY  •  Ventolin HFA, INHALE 2 PUFFS BY MOUTH EVERY 4 HOURS AS NEEDED FOR COUGH AND FOR WHEEZING AND FOR SHORTNESS OF BREATH    ROS:   As documented in history of present illness above    Exam:  /64 (BP Location: Right arm, Patient Position: Sitting, BP Cuff Size: Large adult)   Pulse 62   Temp (!) 35.7 °C (96.3 °F)   Resp 16   Ht 1.676 m (5' 6\")   Wt 115 kg (254 lb)   SpO2 94%   Constitutional: Alert, no distress, plus 3 vital signs  Skin:  Warm, dry, no rashes invisible areas.  Surgical site from port placement is glued closed, without erythema or purulent discharge.  Eye: Equal, round and reactive, conjunctiva clear  Respiratory: Unlabored respiration, lungs clear to auscultation, no wheezes, no rhonchi  Cardiovascular: Normal rate and rhythm.  He has +1 pitting edema to bilateral lower extremities.  Psych: Alert, pleasant, well-groomed, normal affect    Assessment / Plan / Medical Decision making:    \"  1. Malignant neoplasm of prostate (HCC) - mets to bone - Dr. Cohen     2. Essential hypertension  losartan (COZAAR) 100 MG Tab    metoprolol tartrate (LOPRESSOR) 100 MG Tab   3. Gout, unspecified cause, unspecified chronicity, unspecified site  allopurinol (ZYLOPRIM) 300 MG Tab   4. " "Other constipation     5. Bilateral lower extremity edema  furosemide (LASIX) 20 MG Tab    potassium chloride ER (KLOR-CON) 10 MEQ tablet   6. Benign prostatic hyperplasia with lower urinary tract symptoms, symptom details unspecified  tamsulosin (FLOMAX) 0.4 MG capsule   7. Hyperlipidemia, unspecified hyperlipidemia type  rosuvastatin (CRESTOR) 5 MG Tab   8. SOB (shortness of breath) on exertion  VENTOLIN  (90 Base) MCG/ACT Aero Soln inhalation aerosol   9. Dyspnea on exertion     \"  Refilled all chronic medications but deleted hydrochlorothiazide and added on furosemide 20 to 40 mg every morning along with 10 mEq of potassium.  Fortunately he is scheduled for an echocardiogram tomorrow.  Reviewed recent negative chest x-ray from early July and CT scan of his lungs from late July.  Fortunately albuterol is helping and this was refilled for him today.  We discussed elevating his feet when seated and refraining from salt.  He does start a new chemo on Thursday and did well with his port placement yesterday.  He feels very well cared for by Dr. Cohen.  Fortunately he is not in pain and his moods are good.  He is currently living with his daughter and also feels well cared for there.  Labs are up-to-date with Dr. Cohen.  He will follow-up with me over the next few days regarding any improvement with switching hydrochlorothiazide to Lasix.  For constipation, encouraged him to try 1 scoop of generic MiraLAX every morning and email me over the next 72 hours if he does not begin to have larger and softer/more easily passable bowel movements.  He is certainly followed closely by oncology and will follow up with me on an as-needed basis.  "

## 2021-09-01 ENCOUNTER — HOSPITAL ENCOUNTER (OUTPATIENT)
Dept: CARDIOLOGY | Facility: MEDICAL CENTER | Age: 76
End: 2021-09-01
Attending: NURSE PRACTITIONER
Payer: MEDICARE

## 2021-09-01 DIAGNOSIS — C61 MALIGNANT NEOPLASM OF PROSTATE (HCC): ICD-10-CM

## 2021-09-01 LAB
LV EJECT FRACT  99904: 60
LV EJECT FRACT MOD 2C 99903: 53.49
LV EJECT FRACT MOD 4C 99902: 61.14
LV EJECT FRACT MOD BP 99901: 55.61

## 2021-09-01 PROCEDURE — 93306 TTE W/DOPPLER COMPLETE: CPT | Mod: 26 | Performed by: INTERNAL MEDICINE

## 2021-09-01 PROCEDURE — 93306 TTE W/DOPPLER COMPLETE: CPT

## 2021-09-15 ENCOUNTER — HOSPITAL ENCOUNTER (OUTPATIENT)
Dept: LAB | Facility: MEDICAL CENTER | Age: 76
End: 2021-09-15
Attending: NURSE PRACTITIONER
Payer: MEDICARE

## 2021-09-15 LAB
ALBUMIN SERPL BCP-MCNC: 3.9 G/DL (ref 3.2–4.9)
ALBUMIN/GLOB SERPL: 1.3 G/DL
ALP SERPL-CCNC: 534 U/L (ref 30–99)
ALT SERPL-CCNC: 380 U/L (ref 2–50)
ANION GAP SERPL CALC-SCNC: 11 MMOL/L (ref 7–16)
AST SERPL-CCNC: 115 U/L (ref 12–45)
BILIRUB SERPL-MCNC: 0.5 MG/DL (ref 0.1–1.5)
BUN SERPL-MCNC: 21 MG/DL (ref 8–22)
CALCIUM SERPL-MCNC: 9.5 MG/DL (ref 8.5–10.5)
CHLORIDE SERPL-SCNC: 97 MMOL/L (ref 96–112)
CO2 SERPL-SCNC: 28 MMOL/L (ref 20–33)
CREAT SERPL-MCNC: 1.05 MG/DL (ref 0.5–1.4)
GLOBULIN SER CALC-MCNC: 2.9 G/DL (ref 1.9–3.5)
GLUCOSE SERPL-MCNC: 105 MG/DL (ref 65–99)
MAGNESIUM SERPL-MCNC: 1.9 MG/DL (ref 1.5–2.5)
PHOSPHATE SERPL-MCNC: 3.1 MG/DL (ref 2.5–4.5)
POTASSIUM SERPL-SCNC: 3.9 MMOL/L (ref 3.6–5.5)
PROT SERPL-MCNC: 6.8 G/DL (ref 6–8.2)
PSA SERPL-MCNC: 53.5 NG/ML (ref 0–4)
SODIUM SERPL-SCNC: 136 MMOL/L (ref 135–145)

## 2021-09-15 PROCEDURE — 84100 ASSAY OF PHOSPHORUS: CPT

## 2021-09-15 PROCEDURE — 36415 COLL VENOUS BLD VENIPUNCTURE: CPT

## 2021-09-15 PROCEDURE — 83735 ASSAY OF MAGNESIUM: CPT

## 2021-09-15 PROCEDURE — 84153 ASSAY OF PSA TOTAL: CPT

## 2021-09-15 PROCEDURE — 80053 COMPREHEN METABOLIC PANEL: CPT

## 2021-09-23 ENCOUNTER — HOSPITAL ENCOUNTER (OUTPATIENT)
Dept: LAB | Facility: MEDICAL CENTER | Age: 76
End: 2021-09-23
Attending: INTERNAL MEDICINE
Payer: MEDICARE

## 2021-09-23 PROCEDURE — 80053 COMPREHEN METABOLIC PANEL: CPT

## 2021-09-24 LAB
ALBUMIN SERPL BCP-MCNC: 4.1 G/DL (ref 3.2–4.9)
ALBUMIN/GLOB SERPL: 1.9 G/DL
ALP SERPL-CCNC: 166 U/L (ref 30–99)
ALT SERPL-CCNC: 38 U/L (ref 2–50)
ANION GAP SERPL CALC-SCNC: 16 MMOL/L (ref 7–16)
AST SERPL-CCNC: 15 U/L (ref 12–45)
BILIRUB SERPL-MCNC: 0.4 MG/DL (ref 0.1–1.5)
BUN SERPL-MCNC: 19 MG/DL (ref 8–22)
CALCIUM SERPL-MCNC: 9.6 MG/DL (ref 8.5–10.5)
CHLORIDE SERPL-SCNC: 101 MMOL/L (ref 96–112)
CO2 SERPL-SCNC: 23 MMOL/L (ref 20–33)
CREAT SERPL-MCNC: 0.74 MG/DL (ref 0.5–1.4)
GLOBULIN SER CALC-MCNC: 2.2 G/DL (ref 1.9–3.5)
GLUCOSE SERPL-MCNC: 110 MG/DL (ref 65–99)
POTASSIUM SERPL-SCNC: 3.9 MMOL/L (ref 3.6–5.5)
PROT SERPL-MCNC: 6.3 G/DL (ref 6–8.2)
SODIUM SERPL-SCNC: 140 MMOL/L (ref 135–145)

## 2021-09-28 ENCOUNTER — HOSPITAL ENCOUNTER (OUTPATIENT)
Dept: LAB | Facility: MEDICAL CENTER | Age: 76
End: 2021-09-28
Attending: INTERNAL MEDICINE
Payer: MEDICARE

## 2021-09-28 LAB
ALBUMIN SERPL BCP-MCNC: 3.8 G/DL (ref 3.2–4.9)
ALBUMIN/GLOB SERPL: 1.4 G/DL
ALP SERPL-CCNC: 115 U/L (ref 30–99)
ALT SERPL-CCNC: 20 U/L (ref 2–50)
ANION GAP SERPL CALC-SCNC: 11 MMOL/L (ref 7–16)
AST SERPL-CCNC: 15 U/L (ref 12–45)
BILIRUB SERPL-MCNC: 0.6 MG/DL (ref 0.1–1.5)
BUN SERPL-MCNC: 19 MG/DL (ref 8–22)
CALCIUM SERPL-MCNC: 9.1 MG/DL (ref 8.5–10.5)
CHLORIDE SERPL-SCNC: 103 MMOL/L (ref 96–112)
CO2 SERPL-SCNC: 25 MMOL/L (ref 20–33)
CREAT SERPL-MCNC: 0.6 MG/DL (ref 0.5–1.4)
GLOBULIN SER CALC-MCNC: 2.7 G/DL (ref 1.9–3.5)
GLUCOSE SERPL-MCNC: 110 MG/DL (ref 65–99)
POTASSIUM SERPL-SCNC: 3.6 MMOL/L (ref 3.6–5.5)
PROT SERPL-MCNC: 6.5 G/DL (ref 6–8.2)
SODIUM SERPL-SCNC: 139 MMOL/L (ref 135–145)

## 2021-09-28 PROCEDURE — 80053 COMPREHEN METABOLIC PANEL: CPT

## 2021-09-28 PROCEDURE — 36415 COLL VENOUS BLD VENIPUNCTURE: CPT

## 2021-10-08 ENCOUNTER — OFFICE VISIT (OUTPATIENT)
Dept: SLEEP MEDICINE | Facility: MEDICAL CENTER | Age: 76
End: 2021-10-08
Payer: MEDICARE

## 2021-10-08 VITALS
DIASTOLIC BLOOD PRESSURE: 68 MMHG | WEIGHT: 239.19 LBS | BODY MASS INDEX: 38.44 KG/M2 | SYSTOLIC BLOOD PRESSURE: 114 MMHG | HEART RATE: 76 BPM | HEIGHT: 66 IN | OXYGEN SATURATION: 95 % | RESPIRATION RATE: 20 BRPM

## 2021-10-08 DIAGNOSIS — I27.20 PULMONARY HYPERTENSION (HCC): ICD-10-CM

## 2021-10-08 PROCEDURE — 99215 OFFICE O/P EST HI 40 MIN: CPT | Performed by: INTERNAL MEDICINE

## 2021-10-08 RX ORDER — PREDNISONE 5 MG/1
5 TABLET ORAL 2 TIMES DAILY
COMMUNITY
End: 2022-05-21

## 2021-10-08 ASSESSMENT — ENCOUNTER SYMPTOMS
HEMOPTYSIS: 0
SPUTUM PRODUCTION: 0
HEARTBURN: 0
WEIGHT LOSS: 0
FEVER: 0
HEADACHES: 0
DIZZINESS: 1
COUGH: 1
WHEEZING: 0
PALPITATIONS: 0
SHORTNESS OF BREATH: 1
SINUS PAIN: 0
CHILLS: 0

## 2021-10-08 ASSESSMENT — FIBROSIS 4 INDEX: FIB4 SCORE: 1.16

## 2021-10-08 NOTE — ASSESSMENT & PLAN NOTE
Patient with increased shortness of breath and swelling of his lower extremities worsening for the past year. CT performed for cancer staging in July noted trace pleural effusions and ground glass opacities consistent with fluid overload.  Echo on 9/1/2021 shows moderate pulmonary hypertension with RV dilation but preserved systolic function, at this point the patient was less overloaded than previously per family.    - Continue lasix  - daily weights at home  - Refer to cardiology for right heart catheterization  - VQ scan to assess for CTEPH  - Basic rheum studies  - Follow up in 3 months

## 2021-10-08 NOTE — PROGRESS NOTES
Pulmonary Clinic- Initial Consult    Date of Service: 10/8/2021    Referring Physician: Jodie Nolasco A.P.R.N.    Reason for Consult: Pulmonary hypertension    Chief Complaint:   Chief Complaint   Patient presents with   • Establish Care     Referred by Jodie KEATING for Pulmonary HTN   • Other     Labs 09/2021, Echo 09/01/21, CT-TAP 07/29/21       HPI:   Wilfredo Kohler Jr. is a 76 y.o. male who is followed by Domitila Marquez and is referred to the pulmonary clinic for pulmonary hypertension.  He reports he has been having worsening shortness of breath with cough for years.  His cough is intermittent and dry.  He had progressive shortness of breath and lower extremity swelling this year until this summer when he was started on diuretics and has somewhat improved.  He had a CT chest abdomen and pelvis in the hospital for prostate cancer staging which showed groundglass opacities and pleural effusions consistent with fluid overload.  After he was diuresed he had an echo in September, on the first which showed moderate pulmonary hypertension with mild dilation of the right ventricle and normal right ventricular systolic function.  His left ventricle appears normal.  He has no history of heart disease.  He is followed by Dr. Bond with oncology and has been on the following agents for his prostate cancer over the years: Lupron, Casodex, Zytiga, Xgeva, leuprolide, Taxotere.  Patient is currently NYHA class II    Pulmonary Specific History:    Childhood illnesses/Pulmonary diseases: None significant  Smoking history: Never smoker  Prior PFT: No prior pulmonary function tests  Hospitalizations: Hospitalizations in July for fluid overload and heart failure  Hx of GERD: None  Cardiac history: None  Pertinent Medical History: Metastatic prostate cancer to the bone, stage IV  Allergies: None significant  Exposures/Employment history: None significant  Current pulmonary specific medications: None        Past  Medical History:   Diagnosis Date   • Abdominal pain    • Chickenpox    • Cough    • Daytime sleepiness    • Dizziness    • Frequent urination    • Chinese measles    • Hypertension    • Influenza    • Painful urination    • Prostate cancer (HCC)    • Shortness of breath    • Snoring    • Swelling of lower extremity    • Wears glasses        Past Surgical History:   Procedure Laterality Date   • VASECTOMY         Social History     Socioeconomic History   • Marital status:      Spouse name: Not on file   • Number of children: Not on file   • Years of education: Not on file   • Highest education level: Not on file   Occupational History   • Not on file   Tobacco Use   • Smoking status: Former Smoker     Years: 10.00     Types: Cigars     Quit date: 1978     Years since quittin.1   • Smokeless tobacco: Never Used   Vaping Use   • Vaping Use: Never used   Substance and Sexual Activity   • Alcohol use: Yes     Comment: up to 5 drinks per night until mid august when alcohol stopped appealing to him   • Drug use: Never   • Sexual activity: Not on file     Comment: ; 2 daughters; 4 grandkids; 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; moved to [place] from [place] for CA tx   Other Topics Concern   • Not on file   Social History Narrative   • Not on file     Social Determinants of Health     Financial Resource Strain:    • Difficulty of Paying Living Expenses:    Food Insecurity:    • Worried About Running Out of Food in the Last Year:    • Ran Out of Food in the Last Year:    Transportation Needs:    • Lack of Transportation (Medical):    • Lack of Transportation (Non-Medical):    Physical Activity:    • Days of Exercise per Week:    • Minutes of Exercise per Session:    Stress:    • Feeling of Stress :    Social Connections:    • Frequency of Communication with Friends and Family:    • Frequency of Social Gatherings with Friends and Family:    • Attends Buddhist Services:    • Active Member of Clubs or  "Organizations:    • Attends Club or Organization Meetings:    • Marital Status:    Intimate Partner Violence:    • Fear of Current or Ex-Partner:    • Emotionally Abused:    • Physically Abused:    • Sexually Abused:           Family History   Problem Relation Age of Onset   • Alzheimer's Disease Mother    • Cancer Brother         kidney       Current Outpatient Medications on File Prior to Visit   Medication Sig Dispense Refill   • predniSONE (DELTASONE) 5 MG Tab Take 5 mg by mouth 2 times a day.     • aspirin 81 MG EC tablet   Refills: 0, Maintenance     • Cyanocobalamin (VITAMIN B12) 100 MCG Tab Take  by mouth.     • QUERCETIN PO Take  by mouth.     • Cholecalciferol (VITAMIN D3) 50 MCG (2000 UT) Tab Take  by mouth.     • folic acid (FOLVITE) 800 MCG tablet Take  by mouth.     • TURMERIC PO Take  by mouth.     • losartan (COZAAR) 100 MG Tab Take 1 Tablet by mouth every day. 90 Tablet 3   • metoprolol tartrate (LOPRESSOR) 100 MG Tab Take 1.5 Tablets by mouth 2 times a day. (Patient taking differently: Take 100 mg by mouth 2 times a day.) 180 Tablet 3   • furosemide (LASIX) 20 MG Tab Take 1-2 Tablets by mouth every day. In the morning for leg swelling.  \"water pill\" 60 Tablet 4   • potassium chloride ER (KLOR-CON) 10 MEQ tablet Take 1 Tablet by mouth every day. 90 Tablet 1   • allopurinol (ZYLOPRIM) 300 MG Tab Take 1 Tablet by mouth every day. 90 Tablet 3   • tamsulosin (FLOMAX) 0.4 MG capsule TAKE 1 CAPSULE BY MOUTH EVERY DAY AT BEDTIME 90 Capsule 3   • rosuvastatin (CRESTOR) 5 MG Tab Take 1 Tablet by mouth every day. 90 Tablet 1   • VENTOLIN  (90 Base) MCG/ACT Aero Soln inhalation aerosol INHALE 2 PUFFS BY MOUTH EVERY 4 HOURS AS NEEDED FOR COUGH AND FOR WHEEZING AND FOR SHORTNESS OF BREATH 8.5 g 3     No current facility-administered medications on file prior to visit.       Allergies: Patient has no known allergies.      ROS:   Review of Systems   Constitutional: Negative for chills, fever, " "malaise/fatigue and weight loss.   HENT: Negative for congestion and sinus pain.    Respiratory: Positive for cough and shortness of breath. Negative for hemoptysis, sputum production and wheezing.    Cardiovascular: Positive for leg swelling. Negative for chest pain and palpitations.   Gastrointestinal: Negative for heartburn.   Musculoskeletal: Negative for joint pain.   Neurological: Positive for dizziness. Negative for headaches.       Vitals:  /68 (BP Location: Left arm, Patient Position: Sitting, BP Cuff Size: Adult)   Pulse 76   Resp 20   Ht 1.676 m (5' 6\")   Wt 108 kg (239 lb 3 oz)   SpO2 95%     Physical Exam:  Physical Exam  Constitutional:       Appearance: He is obese.   HENT:      Head: Normocephalic and atraumatic.   Cardiovascular:      Rate and Rhythm: Normal rate and regular rhythm.      Heart sounds: Normal heart sounds.   Pulmonary:      Effort: Pulmonary effort is normal. No respiratory distress.      Breath sounds: Normal breath sounds. No wheezing or rales.   Chest:      Chest wall: No tenderness.   Abdominal:      Palpations: Abdomen is soft.   Musculoskeletal:         General: Swelling present. No tenderness or deformity.      Comments: 1+ pitting edema to ankles   Skin:     General: Skin is warm and dry.   Neurological:      General: No focal deficit present.      Mental Status: He is alert.         Laboratory Data:      Pertinent Studies:    PFTs as reviewed by me personally show: none    Imaging as reviewed by me personally show:    CT Chest abdomen and pelvis 7/29/2021 performed to restage prostate CA with bone mets.     IMPRESSION:     1.  There appears to been slight interval progression of disease with new and increasing sclerotic lesions throughout the axial and appendicular skeleton as discussed above. It is possible some of the more sclerotic areas could be more apparent because   of interval treatment.  2.  There is no evidence of solid organ metastases or " lymphadenopathy in the chest, abdomen, or pelvis.    Echo:  9/1/21  CONCLUSIONS  Normal left ventricular systolic function.  Left ventricular ejection fraction is visually estimated to be 60-64%.  Normal right ventricular systolic function.  Right heart pressures are consistent with moderate pulmonary   hypertension.  Trivial pericardial effusion.  No prior study is available for comparison.     Assessment/Plan:    Problem List Items Addressed This Visit     Pulmonary hypertension (HCC)     Patient with increased shortness of breath and swelling of his lower extremities worsening for the past year. CT performed for cancer staging in July noted trace pleural effusions and ground glass opacities consistent with fluid overload.  Echo on 9/1/2021 shows moderate pulmonary hypertension with RV dilation but preserved systolic function, at this point the patient was less overloaded than previously per family.    - Continue lasix  - daily weights at home  - Refer to cardiology for right heart catheterization  - VQ scan to assess for CTEPH  - Basic rheum studies  - Follow up in 3 months         Relevant Orders    NM-LUNG PERFUSION IMAGING    REFERRAL TO CARDIOLOGY    GERALDINE REFLEXIVE PROFILE    RHEUMATOID ARTHRITIS FACTOR           Return in about 3 months (around 1/8/2022).     This note was generated using voice recognition software which has a chance of producing errors of grammar and possibly content.  I have made every reasonable attempt to find and correct any obvious errors, but it should be expected that some may not be found prior to finalization of this note.    Time spent in record review prior to patient arrival, reviewing results, and in face-to-face encounter totaled 45 min, excluding any procedures if performed.      Corry Ku MD RD  Pulmonary and Critical Care Medicine  [facility]

## 2021-10-19 ENCOUNTER — HOSPITAL ENCOUNTER (OUTPATIENT)
Dept: LAB | Facility: MEDICAL CENTER | Age: 76
End: 2021-10-19
Attending: INTERNAL MEDICINE
Payer: MEDICARE

## 2021-10-19 LAB
ALBUMIN SERPL BCP-MCNC: 4 G/DL (ref 3.2–4.9)
ALBUMIN/GLOB SERPL: 1.7 G/DL
ALP SERPL-CCNC: 76 U/L (ref 30–99)
ALT SERPL-CCNC: 13 U/L (ref 2–50)
ANION GAP SERPL CALC-SCNC: 11 MMOL/L (ref 7–16)
AST SERPL-CCNC: 11 U/L (ref 12–45)
BILIRUB SERPL-MCNC: 0.5 MG/DL (ref 0.1–1.5)
BUN SERPL-MCNC: 13 MG/DL (ref 8–22)
CALCIUM SERPL-MCNC: 9.1 MG/DL (ref 8.5–10.5)
CHLORIDE SERPL-SCNC: 103 MMOL/L (ref 96–112)
CO2 SERPL-SCNC: 25 MMOL/L (ref 20–33)
CREAT SERPL-MCNC: 0.63 MG/DL (ref 0.5–1.4)
GLOBULIN SER CALC-MCNC: 2.4 G/DL (ref 1.9–3.5)
GLUCOSE SERPL-MCNC: 93 MG/DL (ref 65–99)
POTASSIUM SERPL-SCNC: 4.9 MMOL/L (ref 3.6–5.5)
PROT SERPL-MCNC: 6.4 G/DL (ref 6–8.2)
SODIUM SERPL-SCNC: 139 MMOL/L (ref 135–145)

## 2021-10-19 PROCEDURE — 36415 COLL VENOUS BLD VENIPUNCTURE: CPT

## 2021-10-19 PROCEDURE — 80053 COMPREHEN METABOLIC PANEL: CPT

## 2021-11-05 ENCOUNTER — HOSPITAL ENCOUNTER (OUTPATIENT)
Dept: RADIOLOGY | Facility: MEDICAL CENTER | Age: 76
End: 2021-11-05
Attending: INTERNAL MEDICINE
Payer: MEDICARE

## 2021-11-05 DIAGNOSIS — T45.1X5S NEUTROPENIA ASSOCIATED WITH MUCOSITIS DUE TO ANTINEOPLASTIC THERAPY (HCC): ICD-10-CM

## 2021-11-05 DIAGNOSIS — C79.52 SECONDARY MALIGNANT NEOPLASM OF BONE AND BONE MARROW (HCC): ICD-10-CM

## 2021-11-05 DIAGNOSIS — D70.1 NEUTROPENIA ASSOCIATED WITH MUCOSITIS DUE TO ANTINEOPLASTIC THERAPY (HCC): ICD-10-CM

## 2021-11-05 DIAGNOSIS — C61 MALIGNANT NEOPLASM OF PROSTATE (HCC): ICD-10-CM

## 2021-11-05 DIAGNOSIS — Z79.899 NEED FOR PROPHYLACTIC CHEMOTHERAPY: ICD-10-CM

## 2021-11-05 DIAGNOSIS — C79.51 SECONDARY MALIGNANT NEOPLASM OF BONE AND BONE MARROW (HCC): ICD-10-CM

## 2021-11-05 DIAGNOSIS — Z51.11 ENCOUNTER FOR ANTINEOPLASTIC CHEMOTHERAPY: ICD-10-CM

## 2021-11-05 DIAGNOSIS — K12.31 NEUTROPENIA ASSOCIATED WITH MUCOSITIS DUE TO ANTINEOPLASTIC THERAPY (HCC): ICD-10-CM

## 2021-11-05 PROCEDURE — 700117 HCHG RX CONTRAST REV CODE 255: Performed by: INTERNAL MEDICINE

## 2021-11-05 PROCEDURE — 71260 CT THORAX DX C+: CPT | Mod: MH

## 2021-11-05 RX ADMIN — IOHEXOL 100 ML: 350 INJECTION, SOLUTION INTRAVENOUS at 15:03

## 2021-11-05 RX ADMIN — IOHEXOL 25 ML: 240 INJECTION, SOLUTION INTRATHECAL; INTRAVASCULAR; INTRAVENOUS; ORAL at 13:44

## 2021-11-18 ENCOUNTER — HOSPITAL ENCOUNTER (OUTPATIENT)
Dept: RADIOLOGY | Facility: MEDICAL CENTER | Age: 76
End: 2021-11-18
Attending: INTERNAL MEDICINE
Payer: MEDICARE

## 2021-11-18 DIAGNOSIS — C79.52 SECONDARY MALIGNANT NEOPLASM OF BONE AND BONE MARROW (HCC): ICD-10-CM

## 2021-11-18 DIAGNOSIS — T45.1X5S NEUTROPENIA ASSOCIATED WITH MUCOSITIS DUE TO ANTINEOPLASTIC THERAPY (HCC): ICD-10-CM

## 2021-11-18 DIAGNOSIS — C79.51 SECONDARY MALIGNANT NEOPLASM OF BONE AND BONE MARROW (HCC): ICD-10-CM

## 2021-11-18 DIAGNOSIS — Z79.899 NEED FOR PROPHYLACTIC CHEMOTHERAPY: ICD-10-CM

## 2021-11-18 DIAGNOSIS — Z51.11 ENCOUNTER FOR ANTINEOPLASTIC CHEMOTHERAPY: ICD-10-CM

## 2021-11-18 DIAGNOSIS — C61 MALIGNANT NEOPLASM OF PROSTATE (HCC): ICD-10-CM

## 2021-11-18 DIAGNOSIS — K12.31 NEUTROPENIA ASSOCIATED WITH MUCOSITIS DUE TO ANTINEOPLASTIC THERAPY (HCC): ICD-10-CM

## 2021-11-18 DIAGNOSIS — D70.1 NEUTROPENIA ASSOCIATED WITH MUCOSITIS DUE TO ANTINEOPLASTIC THERAPY (HCC): ICD-10-CM

## 2021-11-18 PROCEDURE — A9503 TC99M MEDRONATE: HCPCS

## 2021-12-22 ENCOUNTER — TELEPHONE (OUTPATIENT)
Dept: CARDIOLOGY | Facility: MEDICAL CENTER | Age: 76
End: 2021-12-22

## 2021-12-22 ENCOUNTER — HOSPITAL ENCOUNTER (OUTPATIENT)
Dept: RADIOLOGY | Facility: MEDICAL CENTER | Age: 76
End: 2021-12-22
Attending: INTERNAL MEDICINE
Payer: MEDICARE

## 2021-12-22 DIAGNOSIS — I27.20 PULMONARY HYPERTENSION (HCC): ICD-10-CM

## 2021-12-22 PROCEDURE — 71046 X-RAY EXAM CHEST 2 VIEWS: CPT

## 2021-12-22 PROCEDURE — A9540 TC99M MAA: HCPCS

## 2021-12-22 NOTE — TELEPHONE ENCOUNTER
Chart Prep  S/W patient in regards to NP appointment with Dr. Baugh. Patient has not seen a prior cardiologist, no testing/imaging done outside of [facility] and labs are most recent in Epic. Patient verbally confirmed time, date and location of appointment.

## 2021-12-23 ENCOUNTER — HOSPITAL ENCOUNTER (OUTPATIENT)
Dept: LAB | Facility: MEDICAL CENTER | Age: 76
End: 2021-12-23
Attending: INTERNAL MEDICINE
Payer: MEDICARE

## 2021-12-23 LAB
ALBUMIN SERPL BCP-MCNC: 3.5 G/DL (ref 3.2–4.9)
ALBUMIN/GLOB SERPL: 1.3 G/DL
ALP SERPL-CCNC: 75 U/L (ref 30–99)
ALT SERPL-CCNC: 15 U/L (ref 2–50)
ANION GAP SERPL CALC-SCNC: 10 MMOL/L (ref 7–16)
AST SERPL-CCNC: 11 U/L (ref 12–45)
BASOPHILS # BLD AUTO: 0.9 % (ref 0–1.8)
BASOPHILS # BLD: 0.08 K/UL (ref 0–0.12)
BILIRUB SERPL-MCNC: 0.5 MG/DL (ref 0.1–1.5)
BUN SERPL-MCNC: 11 MG/DL (ref 8–22)
CALCIUM SERPL-MCNC: 8.9 MG/DL (ref 8.5–10.5)
CHLORIDE SERPL-SCNC: 102 MMOL/L (ref 96–112)
CO2 SERPL-SCNC: 27 MMOL/L (ref 20–33)
CREAT SERPL-MCNC: 0.54 MG/DL (ref 0.5–1.4)
EOSINOPHIL # BLD AUTO: 0.01 K/UL (ref 0–0.51)
EOSINOPHIL NFR BLD: 0.1 % (ref 0–6.9)
ERYTHROCYTE [DISTWIDTH] IN BLOOD BY AUTOMATED COUNT: 57.9 FL (ref 35.9–50)
GLOBULIN SER CALC-MCNC: 2.8 G/DL (ref 1.9–3.5)
GLUCOSE SERPL-MCNC: 101 MG/DL (ref 65–99)
HCT VFR BLD AUTO: 36.2 % (ref 42–52)
HGB BLD-MCNC: 11.4 G/DL (ref 14–18)
IMM GRANULOCYTES # BLD AUTO: 0.04 K/UL (ref 0–0.11)
IMM GRANULOCYTES NFR BLD AUTO: 0.4 % (ref 0–0.9)
LYMPHOCYTES # BLD AUTO: 1.46 K/UL (ref 1–4.8)
LYMPHOCYTES NFR BLD: 16.4 % (ref 22–41)
MCH RBC QN AUTO: 32.4 PG (ref 27–33)
MCHC RBC AUTO-ENTMCNC: 31.5 G/DL (ref 33.7–35.3)
MCV RBC AUTO: 102.8 FL (ref 81.4–97.8)
MONOCYTES # BLD AUTO: 0.86 K/UL (ref 0–0.85)
MONOCYTES NFR BLD AUTO: 9.7 % (ref 0–13.4)
NEUTROPHILS # BLD AUTO: 6.44 K/UL (ref 1.82–7.42)
NEUTROPHILS NFR BLD: 72.5 % (ref 44–72)
NRBC # BLD AUTO: 0 K/UL
NRBC BLD-RTO: 0 /100 WBC
PLATELET # BLD AUTO: 336 K/UL (ref 164–446)
PMV BLD AUTO: 9.4 FL (ref 9–12.9)
POTASSIUM SERPL-SCNC: 4.2 MMOL/L (ref 3.6–5.5)
PROT SERPL-MCNC: 6.3 G/DL (ref 6–8.2)
PSA SERPL-MCNC: 42.6 NG/ML (ref 0–4)
RBC # BLD AUTO: 3.52 M/UL (ref 4.7–6.1)
SODIUM SERPL-SCNC: 139 MMOL/L (ref 135–145)
WBC # BLD AUTO: 8.9 K/UL (ref 4.8–10.8)

## 2021-12-23 PROCEDURE — 80053 COMPREHEN METABOLIC PANEL: CPT

## 2021-12-23 PROCEDURE — 85025 COMPLETE CBC W/AUTO DIFF WBC: CPT

## 2021-12-23 PROCEDURE — 36415 COLL VENOUS BLD VENIPUNCTURE: CPT

## 2021-12-23 PROCEDURE — 84153 ASSAY OF PSA TOTAL: CPT

## 2021-12-27 ENCOUNTER — APPOINTMENT (OUTPATIENT)
Dept: CARDIOLOGY | Facility: MEDICAL CENTER | Age: 76
End: 2021-12-27
Attending: INTERNAL MEDICINE
Payer: MEDICARE

## 2022-01-11 ENCOUNTER — OFFICE VISIT (OUTPATIENT)
Dept: CARDIOLOGY | Facility: MEDICAL CENTER | Age: 77
End: 2022-01-11
Payer: MEDICARE

## 2022-01-11 VITALS
DIASTOLIC BLOOD PRESSURE: 78 MMHG | SYSTOLIC BLOOD PRESSURE: 139 MMHG | HEART RATE: 57 BPM | RESPIRATION RATE: 18 BRPM | OXYGEN SATURATION: 96 % | HEIGHT: 66 IN | WEIGHT: 236 LBS | BODY MASS INDEX: 37.93 KG/M2

## 2022-01-11 DIAGNOSIS — I27.20 PULMONARY HYPERTENSION (HCC): ICD-10-CM

## 2022-01-11 DIAGNOSIS — I10 ESSENTIAL HYPERTENSION: ICD-10-CM

## 2022-01-11 DIAGNOSIS — E78.5 DYSLIPIDEMIA: ICD-10-CM

## 2022-01-11 DIAGNOSIS — C61 MALIGNANT NEOPLASM OF PROSTATE (HCC): ICD-10-CM

## 2022-01-11 LAB — EKG IMPRESSION: NORMAL

## 2022-01-11 PROCEDURE — 99205 OFFICE O/P NEW HI 60 MIN: CPT | Performed by: INTERNAL MEDICINE

## 2022-01-11 PROCEDURE — 93000 ELECTROCARDIOGRAM COMPLETE: CPT | Performed by: INTERNAL MEDICINE

## 2022-01-11 ASSESSMENT — ENCOUNTER SYMPTOMS
FLANK PAIN: 0
NUMBNESS: 0
WHEEZING: 0
VOMITING: 0
DIARRHEA: 0
NIGHT SWEATS: 0
DECREASED APPETITE: 0
LIGHT-HEADEDNESS: 0
IRREGULAR HEARTBEAT: 0
DYSPNEA ON EXERTION: 0
HEADACHES: 0
DIAPHORESIS: 0
NAUSEA: 0
WEAKNESS: 1
DIZZINESS: 1
FALLS: 0
SORE THROAT: 0
ORTHOPNEA: 0
PND: 0
PARESTHESIAS: 0
BACK PAIN: 0
COUGH: 1
LOSS OF BALANCE: 0
PALPITATIONS: 0
NEAR-SYNCOPE: 0
BLURRED VISION: 0
SHORTNESS OF BREATH: 1
DOUBLE VISION: 0
BLOATING: 0
SYNCOPE: 0
EXCESSIVE DAYTIME SLEEPINESS: 0
CONSTIPATION: 0
BRUISES/BLEEDS EASILY: 1
MYALGIAS: 0
SLEEP DISTURBANCES DUE TO BREATHING: 0
FEVER: 0

## 2022-01-11 ASSESSMENT — FIBROSIS 4 INDEX: FIB4 SCORE: 0.65

## 2022-01-11 NOTE — PROGRESS NOTES
Cardiology Initial Consultation Note    Date of note:    1/11/2022    Primary Care Provider: CARO Riddle  Referring Provider: Corry Ku M.D    Patient Name: Wilfredo Kohler   YOB: 1945  MRN:              [id number]    Chief Complaint   Patient presents with   • Pulmonary Hypertension       History of Present Illness: Mr. Wilfredo Kohler is a 76 y.o. male whose current medical problems include metastatic prostate cancer with bone mets, moderate pulmonary hypertension who is here for cardiac consultation for pulmonary hypertension.    Patient states that in Sept 2021 he was noted to have volume overload and was started on lasix.  Underwent an echo which showed moderate pulmonary HTN and was referred to pulmonary.  Underwent VQ scan which showed no CTEPH and was referred to cardiology.    Has been on furosemide since then.  Was taking it on a daily basis but has not taken it for the past 1 week due to frequent urination.    Cardiovascular Risk Factors:  1. Smoking status: Never smoker  2. Type II Diabetes Mellitus: No  3. Hypertension: Yes  4. Dyslipidemia: Yes  5. Family history of early Coronary Artery Disease in a first degree relative (Male less than 55 years of age; Female less than 65 years of age): Denies  6.  Obesity and/or Metabolic Syndrome: BMI 38  7. Sedentary lifestyle: Yes    Review of Systems   Constitutional: Positive for malaise/fatigue. Negative for decreased appetite, diaphoresis, fever and night sweats.   HENT: Negative for congestion and sore throat.    Eyes: Negative for blurred vision and double vision.   Cardiovascular: Negative for chest pain, cyanosis, dyspnea on exertion, irregular heartbeat, leg swelling, near-syncope, orthopnea, palpitations, paroxysmal nocturnal dyspnea and syncope.   Respiratory: Positive for cough and shortness of breath. Negative for sleep disturbances due to breathing and wheezing.    Endocrine: Negative for cold  "intolerance and heat intolerance.   Hematologic/Lymphatic: Bruises/bleeds easily.   Musculoskeletal: Negative for back pain, falls and myalgias.   Gastrointestinal: Negative for bloating, constipation, diarrhea, nausea and vomiting.   Genitourinary: Positive for frequency and urgency. Negative for dysuria and flank pain.   Neurological: Positive for dizziness and weakness. Negative for excessive daytime sleepiness, headaches, light-headedness, loss of balance, numbness and paresthesias.         Past Medical History:   Diagnosis Date   • Abdominal pain    • Chickenpox    • Cough    • Daytime sleepiness    • Dizziness    • Frequent urination    • Sammarinese measles    • Hypertension    • Influenza    • Painful urination    • Prostate cancer (HCC)    • Shortness of breath    • Snoring    • Swelling of lower extremity    • Wears glasses          Past Surgical History:   Procedure Laterality Date   • VASECTOMY           Current Outpatient Medications   Medication Sig Dispense Refill   • predniSONE (DELTASONE) 5 MG Tab Take 5 mg by mouth 2 times a day.     • aspirin 81 MG EC tablet   Refills: 0, Maintenance     • Cyanocobalamin (VITAMIN B12) 100 MCG Tab Take  by mouth.     • Cholecalciferol (VITAMIN D3) 50 MCG (2000 UT) Tab Take  by mouth.     • folic acid (FOLVITE) 800 MCG tablet Take  by mouth.     • TURMERIC PO Take  by mouth.     • losartan (COZAAR) 100 MG Tab Take 1 Tablet by mouth every day. 90 Tablet 3   • metoprolol tartrate (LOPRESSOR) 100 MG Tab Take 1.5 Tablets by mouth 2 times a day. (Patient taking differently: Take 100 mg by mouth 2 times a day.) 180 Tablet 3   • furosemide (LASIX) 20 MG Tab Take 1-2 Tablets by mouth every day. In the morning for leg swelling.  \"water pill\" 60 Tablet 4   • potassium chloride ER (KLOR-CON) 10 MEQ tablet Take 1 Tablet by mouth every day. 90 Tablet 1   • allopurinol (ZYLOPRIM) 300 MG Tab Take 1 Tablet by mouth every day. 90 Tablet 3   • rosuvastatin (CRESTOR) 5 MG Tab Take 1 Tablet " by mouth every day. 90 Tablet 1   • VENTOLIN  (90 Base) MCG/ACT Aero Soln inhalation aerosol INHALE 2 PUFFS BY MOUTH EVERY 4 HOURS AS NEEDED FOR COUGH AND FOR WHEEZING AND FOR SHORTNESS OF BREATH 8.5 g 3   • QUERCETIN PO Take  by mouth.       No current facility-administered medications for this visit.         No Known Allergies      Family History   Problem Relation Age of Onset   • Alzheimer's Disease Mother    • Cancer Brother         kidney         Social History     Socioeconomic History   • Marital status:      Spouse name: Not on file   • Number of children: Not on file   • Years of education: Not on file   • Highest education level: Not on file   Occupational History   • Not on file   Tobacco Use   • Smoking status: Former Smoker     Years: 10.00     Types: Cigars     Quit date: 1978     Years since quittin.3   • Smokeless tobacco: Never Used   Vaping Use   • Vaping Use: Never used   Substance and Sexual Activity   • Alcohol use: Not Currently     Comment: up to 5 drinks per night until mid august when alcohol stopped appealing to him   • Drug use: Yes     Types: Marijuana     Comment: Gummies 1 every night to sleep   • Sexual activity: Not on file     Comment: ; 2 daughters; 4 grandkids; 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; 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 [employer] [place]   Other Topics Concern   • Not on file   Social History Narrative   • Not on file     Social Determinants of Health     Financial Resource Strain:    • Difficulty of Paying Living Expenses: Not on file   Food Insecurity:    • Worried About Running Out of Food in the Last Year: Not on file   • Ran Out of Food in the Last Year: Not on file   Transportation Needs:    • Lack of Transportation (Medical): Not on file   • Lack of Transportation (Non-Medical): Not on file   Physical Activity:    • Days of Exercise per Week: Not on file   • Minutes of Exercise per Session: Not on file   Stress:    • Feeling of Stress : Not on file  "  Social Connections:    • Frequency of Communication with Friends and Family: Not on file   • Frequency of Social Gatherings with Friends and Family: Not on file   • Attends Restorationist Services: Not on file   • Active Member of Clubs or Organizations: Not on file   • Attends Club or Organization Meetings: Not on file   • Marital Status: Not on file   Intimate Partner Violence:    • Fear of Current or Ex-Partner: Not on file   • Emotionally Abused: Not on file   • Physically Abused: Not on file   • Sexually Abused: Not on file   Housing Stability:    • Unable to Pay for Housing in the Last Year: Not on file   • Number of Places Lived in the Last Year: Not on file   • Unstable Housing in the Last Year: Not on file         Physical Exam:  Ambulatory Vitals  /78 (BP Location: Left arm, Patient Position: Sitting, BP Cuff Size: Adult)   Pulse (!) 57   Resp 18   Ht 1.676 m (5' 6\")   Wt 107 kg (236 lb)   SpO2 96%    Oxygen Therapy:  Pulse Oximetry: 96 %  BP Readings from Last 4 Encounters:   01/11/22 139/78   10/08/21 114/68   08/31/21 136/64       Weight/BMI: Body mass index is 38.09 kg/m².  Wt Readings from Last 4 Encounters:   01/11/22 107 kg (236 lb)   10/08/21 108 kg (239 lb 3 oz)   08/31/21 115 kg (254 lb)       General: Well appearing and in no apparent distress  Eyes: nl conjunctiva, no icteric sclera  ENT: wearing a mask, normal external appearance of ears  Neck: Difficult to assess JVP,  no carotid bruits  Lungs: normal respiratory effort, CTAB  Heart: RRR, no murmurs, no rubs or gallops, +1 pitting edema bilateral lower extremities. No LV/RV heave on cardiac palpatation. 2+ bilateral radial pulses.  1+ bilateral dp pulses.   Abdomen: soft, non tender, distended, no masses, normal bowel sounds.  No HSM.  Extremities/MSK: no clubbing, no cyanosis  Neurological: No focal sensory deficits  Psychiatric: Appropriate affect, A/O x 3, intact judgement and insight  Skin: Warm extremities      Lab Data Review:   "    Lab Results   Component Value Date/Time    SODIUM 139 12/23/2021 02:20 PM    POTASSIUM 4.2 12/23/2021 02:20 PM    CHLORIDE 102 12/23/2021 02:20 PM    CO2 27 12/23/2021 02:20 PM    GLUCOSE 101 (H) 12/23/2021 02:20 PM    BUN 11 12/23/2021 02:20 PM    CREATININE 0.54 12/23/2021 02:20 PM     Lab Results   Component Value Date/Time    ALKPHOSPHAT 75 12/23/2021 02:20 PM    ASTSGOT 11 (L) 12/23/2021 02:20 PM    ALTSGPT 15 12/23/2021 02:20 PM    TBILIRUBIN 0.5 12/23/2021 02:20 PM      Lab Results   Component Value Date/Time    WBC 8.9 12/23/2021 02:20 PM         Cardiac Imaging and Procedures Review:    EKG dated 1/11/2022: My personal interpretation is sinus rhythm    Echo: Echocardiogram performed on 9/1/2021 was personally interpreted by me which shows normal left ventricular size and function with dilated right ventricle, RVSP 60 mmHg.      Radiology test Review:  NM lung V/Q scan (12/22/2021):   IMPRESSION:  Low probability of pulmonary embolism.      Assessment & Plan     1. Pulmonary hypertension (HCC)  EKG    EC-ECHOCARDIOGRAM LTD W/O CONT    OVERNIGHT PULSE OXIMETRY   2. Malignant neoplasm of prostate (HCC)      3. Essential hypertension     4. Dyslipidemia  Lipid Profile         Shared Medical Decision Making:  Patient underwent echocardiogram when he was significantly fluid overloaded.  Echo results reviewed which showed dilated IVC without inspiratory collapse.  Patient appears slightly volume overloaded on physical exam today and has not been taking furosemide for the past 1 week.      Advised patient to restart furosemide daily and once he achieves euvolemia to undergo transthoracic echocardiogram to reevaluate pulmonary pressures.  If found to have persistent moderate pulmonary hypertension, would recommend undergoing right heart catheterization which has been discussed briefly with the patient and is in agreement with the plan.    Suspect LINDY which can also lead to pulmonary hypertension.  Obtain  overnight pulse oximeter to rule out sleep apnea.  If abnormal, referral to sleep medicine for sleep study and CPAP consideration which has been d/w the patient.    Blood pressure well controlled.  Continue losartan 100 mg daily and metoprolol 100 mg twice daily.    Patient is on rosuvastatin 5 mg daily but has not had a lipid panel in couple of years.  Obtain lipid panel.      A total of 61 minutes of time was spent on day of encounter reviewing medical record, performing history and examination, counseling, ordering medication/test/consults and documentation.    All of patient's excellent questions were answered to the best of my knowledge and to his satisfaction.  It was a pleasure seeing Mr. Wilfredo Kohler in my clinic today. Return in about 4 weeks (around 2/8/2022). Patient is aware to call the cardiology clinic with any questions or concerns.      Joe Karimi MD  [facility]  [facility], [address]  [address] [address]  Phone: [phone]  Fax: [phone]    Please note that this dictation was created using voice recognition software. I have made every reasonable attempt to correct obvious errors, but it is possible there are errors of grammar and possibly content that I did not discover before finalizing the note.

## 2022-01-12 ENCOUNTER — TELEPHONE (OUTPATIENT)
Dept: CARDIOLOGY | Facility: MEDICAL CENTER | Age: 77
End: 2022-01-12

## 2022-01-12 NOTE — TELEPHONE ENCOUNTER
OPO Order was completed and faxed to:  [facility], [place]  Phone # [phone]  Fax #: [phone]    Confirmation fax received and sent to Sh[facility]ir.

## 2022-01-14 ENCOUNTER — APPOINTMENT (OUTPATIENT)
Dept: CARDIOLOGY | Facility: MEDICAL CENTER | Age: 77
End: 2022-01-14
Attending: INTERNAL MEDICINE
Payer: MEDICARE

## 2022-01-17 ENCOUNTER — HOSPITAL ENCOUNTER (OUTPATIENT)
Dept: CARDIOLOGY | Facility: MEDICAL CENTER | Age: 77
End: 2022-01-17
Attending: INTERNAL MEDICINE
Payer: MEDICARE

## 2022-01-17 DIAGNOSIS — I27.20 PULMONARY HYPERTENSION (HCC): ICD-10-CM

## 2022-01-17 PROCEDURE — 93321 DOPPLER ECHO F-UP/LMTD STD: CPT

## 2022-01-18 LAB
LV EJECT FRACT  99904: 65
LV EJECT FRACT MOD 2C 99903: 72.77
LV EJECT FRACT MOD 4C 99902: 64.94
LV EJECT FRACT MOD BP 99901: 68.14

## 2022-01-18 PROCEDURE — 93325 DOPPLER ECHO COLOR FLOW MAPG: CPT | Mod: 26 | Performed by: INTERNAL MEDICINE

## 2022-01-18 PROCEDURE — 93308 TTE F-UP OR LMTD: CPT | Mod: 26 | Performed by: INTERNAL MEDICINE

## 2022-01-18 PROCEDURE — 93321 DOPPLER ECHO F-UP/LMTD STD: CPT | Mod: 26 | Performed by: INTERNAL MEDICINE

## 2022-01-24 ENCOUNTER — TELEPHONE (OUTPATIENT)
Dept: CARDIOLOGY | Facility: MEDICAL CENTER | Age: 77
End: 2022-01-24

## 2022-01-24 NOTE — TELEPHONE ENCOUNTER
DA   Patient is calling because she has not received [id number] - OVERNIGHT PULSE OXIMETRY. The referral shows closed. Please reach out.    Thank you,  Erin ANDREWS

## 2022-01-25 NOTE — TELEPHONE ENCOUNTER
OPO Order was completed and faxed to:  [facility]  Phone # [phone]  Fax #: [phone]    [facility] is going to contact patient.

## 2022-02-08 ENCOUNTER — HOSPITAL ENCOUNTER (OUTPATIENT)
Dept: LAB | Facility: MEDICAL CENTER | Age: 77
End: 2022-02-08
Attending: INTERNAL MEDICINE
Payer: MEDICARE

## 2022-02-08 ENCOUNTER — TELEPHONE (OUTPATIENT)
Dept: SCHEDULING | Facility: IMAGING CENTER | Age: 77
End: 2022-02-08

## 2022-02-08 LAB
ALBUMIN SERPL BCP-MCNC: 3.8 G/DL (ref 3.2–4.9)
ALBUMIN/GLOB SERPL: 1.5 G/DL
ALP SERPL-CCNC: 71 U/L (ref 30–99)
ALT SERPL-CCNC: 12 U/L (ref 2–50)
ANION GAP SERPL CALC-SCNC: 13 MMOL/L (ref 7–16)
AST SERPL-CCNC: 12 U/L (ref 12–45)
BASOPHILS # BLD AUTO: 0.7 % (ref 0–1.8)
BASOPHILS # BLD: 0.07 K/UL (ref 0–0.12)
BILIRUB SERPL-MCNC: 0.3 MG/DL (ref 0.1–1.5)
BUN SERPL-MCNC: 13 MG/DL (ref 8–22)
CALCIUM SERPL-MCNC: 9 MG/DL (ref 8.5–10.5)
CHLORIDE SERPL-SCNC: 101 MMOL/L (ref 96–112)
CO2 SERPL-SCNC: 21 MMOL/L (ref 20–33)
CREAT SERPL-MCNC: 0.58 MG/DL (ref 0.5–1.4)
EOSINOPHIL # BLD AUTO: 0.02 K/UL (ref 0–0.51)
EOSINOPHIL NFR BLD: 0.2 % (ref 0–6.9)
ERYTHROCYTE [DISTWIDTH] IN BLOOD BY AUTOMATED COUNT: 57.8 FL (ref 35.9–50)
GLOBULIN SER CALC-MCNC: 2.6 G/DL (ref 1.9–3.5)
GLUCOSE SERPL-MCNC: 117 MG/DL (ref 65–99)
HCT VFR BLD AUTO: 35.6 % (ref 42–52)
HGB BLD-MCNC: 11.7 G/DL (ref 14–18)
IMM GRANULOCYTES # BLD AUTO: 0.05 K/UL (ref 0–0.11)
IMM GRANULOCYTES NFR BLD AUTO: 0.5 % (ref 0–0.9)
LYMPHOCYTES # BLD AUTO: 1.5 K/UL (ref 1–4.8)
LYMPHOCYTES NFR BLD: 15.3 % (ref 22–41)
MCH RBC QN AUTO: 33.6 PG (ref 27–33)
MCHC RBC AUTO-ENTMCNC: 32.9 G/DL (ref 33.7–35.3)
MCV RBC AUTO: 102.3 FL (ref 81.4–97.8)
MONOCYTES # BLD AUTO: 0.98 K/UL (ref 0–0.85)
MONOCYTES NFR BLD AUTO: 10 % (ref 0–13.4)
NEUTROPHILS # BLD AUTO: 7.21 K/UL (ref 1.82–7.42)
NEUTROPHILS NFR BLD: 73.3 % (ref 44–72)
NRBC # BLD AUTO: 0 K/UL
NRBC BLD-RTO: 0 /100 WBC
PLATELET # BLD AUTO: 273 K/UL (ref 164–446)
PMV BLD AUTO: 9.6 FL (ref 9–12.9)
POTASSIUM SERPL-SCNC: 4.3 MMOL/L (ref 3.6–5.5)
PROT SERPL-MCNC: 6.4 G/DL (ref 6–8.2)
RBC # BLD AUTO: 3.48 M/UL (ref 4.7–6.1)
SODIUM SERPL-SCNC: 135 MMOL/L (ref 135–145)
WBC # BLD AUTO: 9.8 K/UL (ref 4.8–10.8)

## 2022-02-08 PROCEDURE — 80053 COMPREHEN METABOLIC PANEL: CPT

## 2022-02-08 PROCEDURE — 36415 COLL VENOUS BLD VENIPUNCTURE: CPT

## 2022-02-08 PROCEDURE — 84153 ASSAY OF PSA TOTAL: CPT

## 2022-02-08 PROCEDURE — 85025 COMPLETE CBC W/AUTO DIFF WBC: CPT

## 2022-02-08 NOTE — TELEPHONE ENCOUNTER
Hi,    This patient was calling back as his appt with [facility] was cancelled due to her being OOO. Rossy called to let him know to be seen with Dr. Crenshaw, but the contact center doesn't schedule for her and only the office does. Can you please call this patient back to schedule with her at [phone].      Thank you,    KATIA

## 2022-02-09 LAB — PSA SERPL-MCNC: 63.3 NG/ML (ref 0–4)

## 2022-02-11 ENCOUNTER — OFFICE VISIT (OUTPATIENT)
Dept: CARDIOLOGY | Facility: MEDICAL CENTER | Age: 77
End: 2022-02-11
Payer: MEDICARE

## 2022-02-11 ENCOUNTER — OFFICE VISIT (OUTPATIENT)
Dept: SLEEP MEDICINE | Facility: MEDICAL CENTER | Age: 77
End: 2022-02-11
Payer: MEDICARE

## 2022-02-11 VITALS
WEIGHT: 233 LBS | HEART RATE: 62 BPM | RESPIRATION RATE: 16 BRPM | HEIGHT: 66 IN | SYSTOLIC BLOOD PRESSURE: 124 MMHG | DIASTOLIC BLOOD PRESSURE: 74 MMHG | OXYGEN SATURATION: 97 % | BODY MASS INDEX: 37.45 KG/M2

## 2022-02-11 VITALS
DIASTOLIC BLOOD PRESSURE: 80 MMHG | RESPIRATION RATE: 16 BRPM | BODY MASS INDEX: 36.96 KG/M2 | SYSTOLIC BLOOD PRESSURE: 124 MMHG | OXYGEN SATURATION: 93 % | HEART RATE: 64 BPM | WEIGHT: 230 LBS | HEIGHT: 66 IN

## 2022-02-11 DIAGNOSIS — I27.20 PULMONARY HYPERTENSION (HCC): ICD-10-CM

## 2022-02-11 DIAGNOSIS — E78.5 DYSLIPIDEMIA: ICD-10-CM

## 2022-02-11 DIAGNOSIS — I10 ESSENTIAL HYPERTENSION: ICD-10-CM

## 2022-02-11 PROCEDURE — 99214 OFFICE O/P EST MOD 30 MIN: CPT

## 2022-02-11 PROCEDURE — 99214 OFFICE O/P EST MOD 30 MIN: CPT | Performed by: INTERNAL MEDICINE

## 2022-02-11 ASSESSMENT — ENCOUNTER SYMPTOMS
PALPITATIONS: 0
HEMOPTYSIS: 0
NAUSEA: 0
MYALGIAS: 0
CHILLS: 0
FEVER: 0
WHEEZING: 0
SPUTUM PRODUCTION: 0
WEAKNESS: 0
WEIGHT LOSS: 1
HEARTBURN: 0
SHORTNESS OF BREATH: 0
COUGH: 0

## 2022-02-11 ASSESSMENT — FIBROSIS 4 INDEX
FIB4 SCORE: .977054301705520525
FIB4 SCORE: .977054301705520525

## 2022-02-11 NOTE — ASSESSMENT & PLAN NOTE
CT chest-abdomen-pelvis on 7/29/2021 performed for cancer screening showed trace pleural effusions and ground glass opacities consistent with fluid overload. ECHO on 9/1/2021 showed moderate pulmonary hypertension with RV dilation but preserved systolic function. He was referred to Cardiology- Dr. Karimi which decided to restart diuretic treatment (Lasix 20 mg QD) and once euvolemic to repeat an ECHO. If the repeat ECHO shows pulmonary hypertension will consider a right heart cath. VQ scan was completed and was negative for chronic thromboembolic pulmonary hypertension. GERALDINE and RH factor was ordered but not yet completed. Symptomatically, the patient's shortness of breath had resolved. Physically, his lungs are clear to ascultation and does have 1+ edema in bilateral lower extremities.   - Complete GERALDINE and RH factor labs  - Continue Lasix as ordered by cardiology  - Perform daily weights  - OPO pending by cardiology to rule out LINDY as a possible cause of pulmonary hypertension.   - Follow-up with cardiology for recommendations regarding right heart cath  - Follow-up in 3 months with labs

## 2022-02-11 NOTE — PROGRESS NOTES
Cardiology Follow-up Consultation Note    Date of note:    2/11/2022    Primary Care Provider: CARO Riddle    Name:             Wilfredo Kohler   YOB: 1945  MRN:               [id number]    Chief Complaint   Patient presents with   • Hypertension   • Hyperlipidemia       HISTORY OF PRESENT ILLNESS  Mr. Wilfredo Kohler is a 76 y.o. male who returns to see us for follow-up of pulmonary hypertension    Last clinic visit: 1/11/2022    Pertinent history:  Admitted in September 2021 with volume overload.  Echo showed pulmonary hypertension  VQ scan showed no CTEPH    Interim History:  Since her last visit, patient reports doing well.  Is taking Lasix daily with no fluid retention.  No episodes of dyspnea on exertion order extremity edema.      Past Medical History:   Diagnosis Date   • Abdominal pain    • Chickenpox    • Cough    • Daytime sleepiness    • Dizziness    • Frequent urination    • Indian measles    • Hypertension    • Influenza    • Painful urination    • Prostate cancer (HCC)    • Shortness of breath    • Snoring    • Swelling of lower extremity    • Wears glasses          Past Surgical History:   Procedure Laterality Date   • VASECTOMY           Current Outpatient Medications   Medication Sig Dispense Refill   • predniSONE (DELTASONE) 5 MG Tab Take 5 mg by mouth 2 times a day.     • aspirin 81 MG EC tablet   Refills: 0, Maintenance     • Cyanocobalamin (VITAMIN B12) 100 MCG Tab Take  by mouth.     • Cholecalciferol (VITAMIN D3) 50 MCG (2000 UT) Tab Take  by mouth.     • folic acid (FOLVITE) 800 MCG tablet Take  by mouth.     • TURMERIC PO Take  by mouth.     • losartan (COZAAR) 100 MG Tab Take 1 Tablet by mouth every day. 90 Tablet 3   • metoprolol tartrate (LOPRESSOR) 100 MG Tab Take 1.5 Tablets by mouth 2 times a day. (Patient taking differently: Take 100 mg by mouth 2 times a day.) 180 Tablet 3   • furosemide (LASIX) 20 MG Tab Take 1-2 Tablets by mouth every  "day. In the morning for leg swelling.  \"water pill\" 60 Tablet 4   • potassium chloride ER (KLOR-CON) 10 MEQ tablet Take 1 Tablet by mouth every day. 90 Tablet 1   • allopurinol (ZYLOPRIM) 300 MG Tab Take 1 Tablet by mouth every day. 90 Tablet 3   • rosuvastatin (CRESTOR) 5 MG Tab Take 1 Tablet by mouth every day. 90 Tablet 1   • VENTOLIN  (90 Base) MCG/ACT Aero Soln inhalation aerosol INHALE 2 PUFFS BY MOUTH EVERY 4 HOURS AS NEEDED FOR COUGH AND FOR WHEEZING AND FOR SHORTNESS OF BREATH 8.5 g 3     No current facility-administered medications for this visit.         No Known Allergies      Family History   Problem Relation Age of Onset   • Alzheimer's Disease Mother    • Cancer Brother         kidney         Social History     Socioeconomic History   • Marital status:      Spouse name: Not on file   • Number of children: Not on file   • Years of education: Not on file   • Highest education level: Not on file   Occupational History   • Not on file   Tobacco Use   • Smoking status: Former Smoker     Years: 10.00     Types: Cigars     Quit date: 1978     Years since quittin.4   • Smokeless tobacco: Never Used   Vaping Use   • Vaping Use: Never used   Substance and Sexual Activity   • Alcohol use: Not Currently     Comment: up to 5 drinks per night until mid august when alcohol stopped appealing to him   • Drug use: Yes     Types: Marijuana     Comment: Gummies 1 every night to sleep   • Sexual activity: Not on file     Comment: ; 2 daughters; 4 grandkids; 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; 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 [place]   Other Topics Concern   • Not on file   Social History Narrative   • Not on file     Social Determinants of Health     Financial Resource Strain:    • Difficulty of Paying Living Expenses: Not on file   Food Insecurity:    • Worried About Running Out of Food in the Last Year: Not on file   • Ran Out of Food in the Last Year: Not on file   Transportation Needs:    • " "Lack of Transportation (Medical): Not on file   • Lack of Transportation (Non-Medical): Not on file   Physical Activity:    • Days of Exercise per Week: Not on file   • Minutes of Exercise per Session: Not on file   Stress:    • Feeling of Stress : Not on file   Social Connections:    • Frequency of Communication with Friends and Family: Not on file   • Frequency of Social Gatherings with Friends and Family: Not on file   • Attends Advent Services: Not on file   • Active Member of Clubs or Organizations: Not on file   • Attends Club or Organization Meetings: Not on file   • Marital Status: Not on file   Intimate Partner Violence:    • Fear of Current or Ex-Partner: Not on file   • Emotionally Abused: Not on file   • Physically Abused: Not on file   • Sexually Abused: Not on file   Housing Stability:    • Unable to Pay for Housing in the Last Year: Not on file   • Number of Places Lived in the Last Year: Not on file   • Unstable Housing in the Last Year: Not on file         Physical Exam:  Ambulatory Vitals  /74 (BP Location: Left arm, Patient Position: Sitting, BP Cuff Size: Adult)   Pulse 62   Resp 16   Ht 1.676 m (5' 6\")   Wt 106 kg (233 lb)   SpO2 97%    Oxygen Therapy:  Pulse Oximetry: 97 %  BP Readings from Last 4 Encounters:   02/11/22 124/74   02/11/22 124/80   01/11/22 139/78   10/08/21 114/68       Weight/BMI: Body mass index is 37.61 kg/m².  Wt Readings from Last 4 Encounters:   02/11/22 106 kg (233 lb)   02/11/22 104 kg (230 lb)   01/11/22 107 kg (236 lb)   10/08/21 108 kg (239 lb 3 oz)       GEN: Well developed, well nourished and in no acute distress.  HEART: no significant JVD, regular rate and rhythm, normal S1 and S2, no murmurs, no third heart sounds, normal cardiac palpation  LUNG: clear to auscultation bilaterally, no wheezing, no crackles, normal respiratory effort on room air  ABDOMEN: soft, non-tender, non-distended, normal bowel sounds throughout  EXTREMITIES: no peripheral edema " noted  VASCULAR: no significantly elevated jugular venous pressure, no carotid bruits noted, radial pulses 2+ and equal      Lab Data Review:    Lab Results   Component Value Date/Time    SODIUM 135 02/08/2022 03:31 PM    POTASSIUM 4.3 02/08/2022 03:31 PM    CHLORIDE 101 02/08/2022 03:31 PM    CO2 21 02/08/2022 03:31 PM    GLUCOSE 117 (H) 02/08/2022 03:31 PM    BUN 13 02/08/2022 03:31 PM    CREATININE 0.58 02/08/2022 03:31 PM     Lab Results   Component Value Date/Time    ALKPHOSPHAT 71 02/08/2022 03:31 PM    ASTSGOT 12 02/08/2022 03:31 PM    ALTSGPT 12 02/08/2022 03:31 PM    TBILIRUBIN 0.3 02/08/2022 03:31 PM      Lab Results   Component Value Date/Time    WBC 9.8 02/08/2022 03:31 PM       Cardiac Imaging and Procedures Review:    EKG dated 1/11/2022: My personal interpretation is sinus rhythm    Echo dated 9/1/2021:   LVEF 60%, right heart pressures consistent with moderate pulmonary hypertension    Echocardiogram performed on 1/17/2022 was personally interpreted by me which shows normal pulmonary pressures.      Radiology test Review:  CXR: No acute cardiopulmonary abnormality noted.      Assessment & Plan     1. Pulmonary hypertension (HCC)     2. Essential hypertension     3. Dyslipidemia         Discussed echocardiogram results with the patient which shows normalization of pulmonary pressures with no further evidence of pulmonary hypertension.  Discussed with the patient and his daughter that pressures were likely elevated due to volume overload and now that he is euvolemic it is normal.  No further testing from a cardiac perspective.    Blood pressure under excellent control.  Continue losartan 100 mg and metoprolol tartrate 100 mg twice daily.    Patient did not complete lipid panel that was ordered during last visit.  Encouraged to complete labs.  Continue rosuvastatin 5 mg daily.    Continue Lasix on an as-needed basis.      All of patient and his daughter's excellent questions were answered to the best of  my knowledge and to their satisfaction.  It was a pleasure seeing Mr. Wilfredo Kohler in my clinic today. Return in about 1 year (around 2/11/2023). Patient is aware to call the cardiology clinic with any questions or concerns.      Joe Karimi MD  [facility]  [facility], [facility].  [address] [address]  Phone: [phone]  Fax: [phone]    Please note that this dictation was created using voice recognition software. I have made every reasonable attempt to correct obvious errors, but it is possible there are errors of grammar and possibly content that I did not discover before finalizing the note.

## 2022-02-11 NOTE — PROGRESS NOTES
"Pulmonary Clinic Note    Date of Visit: 2/11/2022     Chief Complaint:  Chief Complaint   Patient presents with   • Follow-Up     Pulmonary hypertension      HPI:   Wilfredo Kohler Jr. is a very pleasant 76 y.o. year old male never smoker, with a PMHx of HTN, HLD, pulmonary hypertension, GOUT, BPH, and malignant neoplasm of prostate with mets to the bone who presented to the Pulmonary Clinic for a regular follow up for pulmonary hypertension.    Per Dr. Ku's HPI:  \"Wilfredo Kohler Jr. is a 76 y.o. male who is followed by Domitila Marquez and is referred to the pulmonary clinic for pulmonary hypertension.  He reports he has been having worsening shortness of breath with cough for years.  His cough is intermittent and dry.  He had progressive shortness of breath and lower extremity swelling this year until this summer when he was started on diuretics and has somewhat improved.  He had a CT chest abdomen and pelvis in the hospital for prostate cancer staging which showed groundglass opacities and pleural effusions consistent with fluid overload.  After he was diuresed he had an echo in September, on the first which showed moderate pulmonary hypertension with mild dilation of the right ventricle and normal right ventricular systolic function.  His left ventricle appears normal.  He has no history of heart disease.  He is followed by Dr. Bond with oncology and has been on the following agents for his prostate cancer over the years: Lupron, Casodex, Zytiga, Xgeva, leuprolide, Taxotere.\"    He was referred to Cardiology- Dr. Karimi on 1/11/2022 for possible right heart catheterization. Per Dr. Karimi's note, the decision was made to restart furosemide daily and once he achieves euvolemia to undergo transthoracic echocardiogram to reevaluate pulmonary pressures.  If found to have persistent moderate pulmonary hypertension, would recommend undergoing right heart catheterization.     Today he is " accompanied by his daughter. He states that his shortness of breath has resolved. He is currently on 20 mg of Lasix QD. He did not take dose today because of his appointments. He does report that his swelling has improved. He has not noticed any abdominal swelling. He has not been taking his weight daily, as he did not know he was supposed to. The GERALDINE and RH factor labs were ordered by Dr. Ku at the last appointment but were not completed.     NYHA Class: 1    Past Medical History:   Diagnosis Date   • Abdominal pain    • Chickenpox    • Cough    • Daytime sleepiness    • Dizziness    • Frequent urination    • [place] measles    • Hypertension    • Influenza    • Painful urination    • Prostate cancer (HCC)    • Shortness of breath    • Snoring    • Swelling of lower extremity    • Wears glasses      Past Surgical History:   Procedure Laterality Date   • VASECTOMY       Social History     Socioeconomic History   • Marital status:      Spouse name: Not on file   • Number of children: Not on file   • Years of education: Not on file   • Highest education level: Not on file   Occupational History   • Not on file   Tobacco Use   • Smoking status: Former Smoker     Years: 10.00     Types: Cigars     Quit date: 1978     Years since quittin.4   • Smokeless tobacco: Never Used   Vaping Use   • Vaping Use: Never used   Substance and Sexual Activity   • Alcohol use: Not Currently     Comment: up to 5 drinks per night until mid august when alcohol stopped appealing to him   • Drug use: Yes     Types: Marijuana     Comment: Gummies 1 every night to sleep   • Sexual activity: Not on file     Comment: ; 2 daughters; 4 grandkids; retired - [employer]; moved to [place] from [place] [facility]   Other Topics Concern   • Not on file   Social History Narrative   • Not on file     Social Determinants of Health     Financial Resource Strain:    • Difficulty of Paying Living Expenses: Not on file   Food  Insecurity:    • Worried About Running Out of Food in the Last Year: Not on file   • Ran Out of Food in the Last Year: Not on file   Transportation Needs:    • Lack of Transportation (Medical): Not on file   • Lack of Transportation (Non-Medical): Not on file   Physical Activity:    • Days of Exercise per Week: Not on file   • Minutes of Exercise per Session: Not on file   Stress:    • Feeling of Stress : Not on file   Social Connections:    • Frequency of Communication with Friends and Family: Not on file   • Frequency of Social Gatherings with Friends and Family: Not on file   • Attends Mosque Services: Not on file   • Active Member of Clubs or Organizations: Not on file   • Attends Club or Organization Meetings: Not on file   • Marital Status: Not on file   Intimate Partner Violence:    • Fear of Current or Ex-Partner: Not on file   • Emotionally Abused: Not on file   • Physically Abused: Not on file   • Sexually Abused: Not on file   Housing Stability:    • Unable to Pay for Housing in the Last Year: Not on file   • Number of Places Lived in the Last Year: Not on file   • Unstable Housing in the Last Year: Not on file        Family History   Problem Relation Age of Onset   • Alzheimer's Disease Mother    • Cancer Brother         kidney     Current Outpatient Medications on File Prior to Visit   Medication Sig Dispense Refill   • predniSONE (DELTASONE) 5 MG Tab Take 5 mg by mouth 2 times a day.     • aspirin 81 MG EC tablet   Refills: 0, Maintenance     • Cyanocobalamin (VITAMIN B12) 100 MCG Tab Take  by mouth.     • Cholecalciferol (VITAMIN D3) 50 MCG (2000 UT) Tab Take  by mouth.     • folic acid (FOLVITE) 800 MCG tablet Take  by mouth.     • TURMERIC PO Take  by mouth.     • losartan (COZAAR) 100 MG Tab Take 1 Tablet by mouth every day. 90 Tablet 3   • metoprolol tartrate (LOPRESSOR) 100 MG Tab Take 1.5 Tablets by mouth 2 times a day. (Patient taking differently: Take 100 mg by mouth 2 times a day.) 180  "Tablet 3   • furosemide (LASIX) 20 MG Tab Take 1-2 Tablets by mouth every day. In the morning for leg swelling.  \"water pill\" 60 Tablet 4   • potassium chloride ER (KLOR-CON) 10 MEQ tablet Take 1 Tablet by mouth every day. 90 Tablet 1   • allopurinol (ZYLOPRIM) 300 MG Tab Take 1 Tablet by mouth every day. 90 Tablet 3   • rosuvastatin (CRESTOR) 5 MG Tab Take 1 Tablet by mouth every day. 90 Tablet 1   • VENTOLIN  (90 Base) MCG/ACT Aero Soln inhalation aerosol INHALE 2 PUFFS BY MOUTH EVERY 4 HOURS AS NEEDED FOR COUGH AND FOR WHEEZING AND FOR SHORTNESS OF BREATH 8.5 g 3   • QUERCETIN PO Take  by mouth.       No current facility-administered medications on file prior to visit.     Allergies: Patient has no known allergies.    ROS:   Review of Systems   Constitutional: Positive for weight loss (due to diuretic use). Negative for chills, fever and malaise/fatigue.   Respiratory: Negative for cough, hemoptysis, sputum production, shortness of breath and wheezing.    Cardiovascular: Positive for leg swelling (bilateral lower extremities). Negative for chest pain and palpitations.   Gastrointestinal: Negative for heartburn and nausea.   Musculoskeletal: Negative for myalgias.   Neurological: Negative for weakness.     Vitals:  /80   Pulse 64   Resp 16   Ht 1.676 m (5' 6\")   Wt 104 kg (230 lb)   SpO2 93%     Physical Exam:  Physical Exam  Constitutional:       General: He is not in acute distress.     Appearance: Normal appearance. He is not ill-appearing, toxic-appearing or diaphoretic.   Cardiovascular:      Rate and Rhythm: Normal rate and regular rhythm.      Pulses: Normal pulses.      Heart sounds: Normal heart sounds. No murmur heard.  No friction rub. No gallop.    Pulmonary:      Effort: Pulmonary effort is normal. No respiratory distress.      Breath sounds: Normal breath sounds. No stridor. No wheezing, rhonchi or rales.   Chest:      Chest wall: No tenderness.   Abdominal:      General: There is " no distension.   Musculoskeletal:         General: Swelling present.      Right lower leg: Edema (1+ edema) present.      Left lower leg: Edema (1+ edema) present.   Skin:     General: Skin is warm and dry.      Coloration: Skin is not jaundiced or pale.      Findings: No bruising or erythema.   Neurological:      General: No focal deficit present.      Mental Status: He is alert and oriented to person, place, and time. Mental status is at baseline.   Psychiatric:         Mood and Affect: Mood normal.         Behavior: Behavior normal.         Thought Content: Thought content normal.         Judgment: Judgment normal.       Laboratory Data:  PFTs- No prior PFTs on file.    VQ scan: (Date: 12/22/2021)-  Findings:      There is some central radiotracer clumping. No significant mismatched ventilation    perfusion defect is seen.  Impression:       Low probability of pulmonary embolism.    CT Chest-abdomen-pelvis: (Date: 7/29/2021)- Ordered for restaging of cancer    Impression:  1.  There appears to been slight interval progression of disease with new and increasing sclerotic lesions throughout the axial and appendicular skeleton as discussed above. It is possible some of the more sclerotic areas could be more apparent because   of interval treatment.  2.  There is no evidence of solid organ metastases or lymphadenopathy in the chest, abdomen, or pelvis.    ECHO: (Date: 9/1/2021)-  Conclusion:  Normal left ventricular systolic function.  Left ventricular ejection fraction is visually estimated to be 60-64%.  Normal right ventricular systolic function.  Right heart pressures are consistent with moderate pulmonary   hypertension.  Trivial pericardial effusion.  No prior study is available for comparison.     Assessment and Plan:    Problem List Items Addressed This Visit     Pulmonary hypertension (HCC)     CT chest-abdomen-pelvis on 7/29/2021 performed for cancer screening showed trace pleural effusions and ground glass  opacities consistent with fluid overload. ECHO on 9/1/2021 showed moderate pulmonary hypertension with RV dilation but preserved systolic function. He was referred to Cardiology- Dr. Karimi which decided to restart diuretic treatment (Lasix 20 mg QD) and once euvolemic to repeat an ECHO. If the repeat ECHO shows pulmonary hypertension will consider a right heart cath. VQ scan was completed and was negative for chronic thromboembolic pulmonary hypertension. GERALDINE and RH factor was ordered but not yet completed. Symptomatically, the patient's shortness of breath had resolved. Physically, his lungs are clear to ascultation and does have 1+ edema in bilateral lower extremities.   - Complete GERALDINE and RH factor labs  - Continue Lasix as ordered by cardiology  - Perform daily weights  - OPO pending by cardiology to rule out LINDY as a possible cause of pulmonary hypertension.   - Follow-up with cardiology for recommendations regarding right heart cath  - Follow-up in 3 months with labs              Return in about 3 months (around 5/11/2022), or sooner if needed, for F/U with Dr. Ku.     This note was generated using voice recognition software which has a chance of producing errors of grammar and possibly content.  I have made every reasonable attempt to find and correct any obvious errors, but it should be expected that some may not be found prior to finalization of this note.    Time spent in record review prior to patient arrival, reviewing results, and in face-to-face encounter totaled 33 min.  __________  ZURI Zhao  Pulmonary Medicine  [facility]

## 2022-03-02 ENCOUNTER — HOSPITAL ENCOUNTER (OUTPATIENT)
Dept: RADIOLOGY | Facility: MEDICAL CENTER | Age: 77
End: 2022-03-02
Attending: INTERNAL MEDICINE
Payer: MEDICARE

## 2022-03-02 DIAGNOSIS — D70.1 AGRANULOCYTOSIS SECONDARY TO CANCER CHEMOTHERAPY (HCC): ICD-10-CM

## 2022-03-02 DIAGNOSIS — Z51.11 ENCOUNTER FOR ANTINEOPLASTIC CHEMOTHERAPY: ICD-10-CM

## 2022-03-02 DIAGNOSIS — Z79.899 OTHER LONG TERM (CURRENT) DRUG THERAPY: ICD-10-CM

## 2022-03-02 DIAGNOSIS — D63.8 ANEMIA IN OTHER CHRONIC DISEASES CLASSIFIED ELSEWHERE: ICD-10-CM

## 2022-03-02 DIAGNOSIS — C79.51 SECONDARY MALIGNANT NEOPLASM OF BONE (HCC): ICD-10-CM

## 2022-03-02 DIAGNOSIS — Z45.2 ENCOUNTER FOR ADJUSTMENT AND MANAGEMENT OF VASCULAR ACCESS DEVICE: ICD-10-CM

## 2022-03-02 DIAGNOSIS — T45.1X5A AGRANULOCYTOSIS SECONDARY TO CANCER CHEMOTHERAPY (HCC): ICD-10-CM

## 2022-03-02 DIAGNOSIS — C61 MALIGNANT NEOPLASM OF PROSTATE (HCC): ICD-10-CM

## 2022-03-02 PROCEDURE — A9503 TC99M MEDRONATE: HCPCS

## 2022-03-02 PROCEDURE — 700117 HCHG RX CONTRAST REV CODE 255: Performed by: INTERNAL MEDICINE

## 2022-03-02 PROCEDURE — 71260 CT THORAX DX C+: CPT | Mod: MH

## 2022-03-02 RX ADMIN — IOHEXOL 100 ML: 350 INJECTION, SOLUTION INTRAVENOUS at 11:21

## 2022-04-15 DIAGNOSIS — E78.5 HYPERLIPIDEMIA, UNSPECIFIED HYPERLIPIDEMIA TYPE: ICD-10-CM

## 2022-04-15 DIAGNOSIS — R60.0 BILATERAL LOWER EXTREMITY EDEMA: ICD-10-CM

## 2022-04-15 RX ORDER — ROSUVASTATIN CALCIUM 5 MG/1
TABLET, COATED ORAL
Qty: 90 TABLET | Refills: 0 | Status: ON HOLD | OUTPATIENT
Start: 2022-04-15 | End: 2022-05-24 | Stop reason: SDUPTHER

## 2022-04-15 RX ORDER — POTASSIUM CHLORIDE 750 MG/1
TABLET, FILM COATED, EXTENDED RELEASE ORAL
Qty: 90 TABLET | Refills: 0 | Status: SHIPPED | OUTPATIENT
Start: 2022-04-15

## 2022-04-15 NOTE — TELEPHONE ENCOUNTER
Received request via: Pharmacy    Was the patient seen in the last year in this department? Yes  8/31/21  Does the patient have an active prescription (recently filled or refills available) for medication(s) requested? No

## 2022-05-21 ENCOUNTER — APPOINTMENT (OUTPATIENT)
Dept: RADIOLOGY | Facility: MEDICAL CENTER | Age: 77
DRG: 308 | End: 2022-05-21
Attending: EMERGENCY MEDICINE
Payer: MEDICARE

## 2022-05-21 ENCOUNTER — HOSPITAL ENCOUNTER (INPATIENT)
Facility: MEDICAL CENTER | Age: 77
LOS: 3 days | DRG: 308 | End: 2022-05-24
Attending: EMERGENCY MEDICINE | Admitting: INTERNAL MEDICINE
Payer: MEDICARE

## 2022-05-21 DIAGNOSIS — I48.91 ATRIAL FIBRILLATION WITH RAPID VENTRICULAR RESPONSE (HCC): ICD-10-CM

## 2022-05-21 DIAGNOSIS — E86.0 DEHYDRATION: ICD-10-CM

## 2022-05-21 DIAGNOSIS — I10 ESSENTIAL HYPERTENSION: ICD-10-CM

## 2022-05-21 DIAGNOSIS — E87.1 HYPONATREMIA: ICD-10-CM

## 2022-05-21 DIAGNOSIS — G93.40 ACUTE ENCEPHALOPATHY: ICD-10-CM

## 2022-05-21 DIAGNOSIS — E78.5 HYPERLIPIDEMIA, UNSPECIFIED HYPERLIPIDEMIA TYPE: ICD-10-CM

## 2022-05-21 DIAGNOSIS — C61 MALIGNANT NEOPLASM OF PROSTATE (HCC): ICD-10-CM

## 2022-05-21 DIAGNOSIS — R41.82 ALTERED MENTAL STATUS, UNSPECIFIED ALTERED MENTAL STATUS TYPE: ICD-10-CM

## 2022-05-21 PROBLEM — F10.10 ALCOHOL ABUSE: Status: ACTIVE | Noted: 2022-05-21

## 2022-05-21 PROBLEM — E83.42 HYPOMAGNESEMIA: Status: ACTIVE | Noted: 2022-05-21

## 2022-05-21 PROBLEM — G47.33 OSA (OBSTRUCTIVE SLEEP APNEA): Status: ACTIVE | Noted: 2022-05-21

## 2022-05-21 PROBLEM — E88.09 HYPOALBUMINEMIA: Status: ACTIVE | Noted: 2022-05-21

## 2022-05-21 LAB
ALBUMIN SERPL BCP-MCNC: 2.6 G/DL (ref 3.2–4.9)
ALBUMIN/GLOB SERPL: 0.9 G/DL
ALP SERPL-CCNC: 332 U/L (ref 30–99)
ALT SERPL-CCNC: 33 U/L (ref 2–50)
ANION GAP SERPL CALC-SCNC: 11 MMOL/L (ref 7–16)
APPEARANCE UR: ABNORMAL
AST SERPL-CCNC: 90 U/L (ref 12–45)
BACTERIA #/AREA URNS HPF: NEGATIVE /HPF
BASOPHILS # BLD AUTO: 0 % (ref 0–1.8)
BASOPHILS # BLD: 0 K/UL (ref 0–0.12)
BILIRUB SERPL-MCNC: 0.8 MG/DL (ref 0.1–1.5)
BILIRUB UR QL STRIP.AUTO: NEGATIVE
BUN SERPL-MCNC: 26 MG/DL (ref 8–22)
CALCIUM SERPL-MCNC: 8.3 MG/DL (ref 8.5–10.5)
CHLORIDE SERPL-SCNC: 96 MMOL/L (ref 96–112)
CO2 SERPL-SCNC: 18 MMOL/L (ref 20–33)
COLOR UR: YELLOW
CREAT SERPL-MCNC: 0.74 MG/DL (ref 0.5–1.4)
CREAT UR-MCNC: 130.3 MG/DL
EKG IMPRESSION: NORMAL
EOSINOPHIL # BLD AUTO: 0.07 K/UL (ref 0–0.51)
EOSINOPHIL NFR BLD: 1.8 % (ref 0–6.9)
EPI CELLS #/AREA URNS HPF: ABNORMAL /HPF
ERYTHROCYTE [DISTWIDTH] IN BLOOD BY AUTOMATED COUNT: 45.2 FL (ref 35.9–50)
GFR SERPLBLD CREATININE-BSD FMLA CKD-EPI: 93 ML/MIN/1.73 M 2
GLOBULIN SER CALC-MCNC: 2.8 G/DL (ref 1.9–3.5)
GLUCOSE SERPL-MCNC: 150 MG/DL (ref 65–99)
GLUCOSE UR STRIP.AUTO-MCNC: NEGATIVE MG/DL
HCT VFR BLD AUTO: 31.4 % (ref 42–52)
HGB BLD-MCNC: 11 G/DL (ref 14–18)
HYALINE CASTS #/AREA URNS LPF: ABNORMAL /LPF
KETONES UR STRIP.AUTO-MCNC: NEGATIVE MG/DL
LEUKOCYTE ESTERASE UR QL STRIP.AUTO: NEGATIVE
LYMPHOCYTES # BLD AUTO: 0.97 K/UL (ref 1–4.8)
LYMPHOCYTES NFR BLD: 23.7 % (ref 22–41)
MAGNESIUM SERPL-MCNC: 1.4 MG/DL (ref 1.5–2.5)
MANUAL DIFF BLD: NORMAL
MCH RBC QN AUTO: 32.2 PG (ref 27–33)
MCHC RBC AUTO-ENTMCNC: 35 G/DL (ref 33.7–35.3)
MCV RBC AUTO: 91.8 FL (ref 81.4–97.8)
METAMYELOCYTES NFR BLD MANUAL: 1.8 %
MICRO URNS: ABNORMAL
MONOCYTES # BLD AUTO: 0.15 K/UL (ref 0–0.85)
MONOCYTES NFR BLD AUTO: 3.6 % (ref 0–13.4)
MORPHOLOGY BLD-IMP: NORMAL
MYELOCYTES NFR BLD MANUAL: 2.7 %
NEUTROPHILS # BLD AUTO: 2.72 K/UL (ref 1.82–7.42)
NEUTROPHILS NFR BLD: 64.6 % (ref 44–72)
NEUTS BAND NFR BLD MANUAL: 1.8 % (ref 0–10)
NITRITE UR QL STRIP.AUTO: NEGATIVE
NRBC # BLD AUTO: 0.09 K/UL
NRBC BLD-RTO: 2.2 /100 WBC
NT-PROBNP SERPL IA-MCNC: 514 PG/ML (ref 0–125)
PH UR STRIP.AUTO: 5 [PH] (ref 5–8)
PHOSPHATE SERPL-MCNC: 4.3 MG/DL (ref 2.5–4.5)
PLATELET # BLD AUTO: 122 K/UL (ref 164–446)
PLATELET BLD QL SMEAR: NORMAL
PMV BLD AUTO: 9.4 FL (ref 9–12.9)
POTASSIUM SERPL-SCNC: 4.1 MMOL/L (ref 3.6–5.5)
PROT SERPL-MCNC: 5.4 G/DL (ref 6–8.2)
PROT UR QL STRIP: NEGATIVE MG/DL
RBC # BLD AUTO: 3.42 M/UL (ref 4.7–6.1)
RBC # URNS HPF: ABNORMAL /HPF
RBC BLD AUTO: NORMAL
RBC UR QL AUTO: NEGATIVE
RENAL EPI CELLS #/AREA URNS HPF: ABNORMAL /HPF
SODIUM SERPL-SCNC: 125 MMOL/L (ref 135–145)
SP GR UR STRIP.AUTO: 1.02
TROPONIN T SERPL-MCNC: 22 NG/L (ref 6–19)
TSH SERPL DL<=0.005 MIU/L-ACNC: 1.22 UIU/ML (ref 0.38–5.33)
UROBILINOGEN UR STRIP.AUTO-MCNC: 1 MG/DL
WBC # BLD AUTO: 4.1 K/UL (ref 4.8–10.8)
WBC #/AREA URNS HPF: ABNORMAL /HPF

## 2022-05-21 PROCEDURE — 700105 HCHG RX REV CODE 258: Performed by: EMERGENCY MEDICINE

## 2022-05-21 PROCEDURE — 84300 ASSAY OF URINE SODIUM: CPT

## 2022-05-21 PROCEDURE — A9270 NON-COVERED ITEM OR SERVICE: HCPCS | Performed by: INTERNAL MEDICINE

## 2022-05-21 PROCEDURE — 700102 HCHG RX REV CODE 250 W/ 637 OVERRIDE(OP): Performed by: INTERNAL MEDICINE

## 2022-05-21 PROCEDURE — 96376 TX/PRO/DX INJ SAME DRUG ADON: CPT

## 2022-05-21 PROCEDURE — 93005 ELECTROCARDIOGRAM TRACING: CPT | Performed by: EMERGENCY MEDICINE

## 2022-05-21 PROCEDURE — 96366 THER/PROPH/DIAG IV INF ADDON: CPT

## 2022-05-21 PROCEDURE — 84100 ASSAY OF PHOSPHORUS: CPT

## 2022-05-21 PROCEDURE — 85025 COMPLETE CBC W/AUTO DIFF WBC: CPT

## 2022-05-21 PROCEDURE — 700105 HCHG RX REV CODE 258: Performed by: INTERNAL MEDICINE

## 2022-05-21 PROCEDURE — 99291 CRITICAL CARE FIRST HOUR: CPT | Performed by: INTERNAL MEDICINE

## 2022-05-21 PROCEDURE — 70450 CT HEAD/BRAIN W/O DYE: CPT | Mod: ME

## 2022-05-21 PROCEDURE — 36415 COLL VENOUS BLD VENIPUNCTURE: CPT

## 2022-05-21 PROCEDURE — 82436 ASSAY OF URINE CHLORIDE: CPT

## 2022-05-21 PROCEDURE — 96365 THER/PROPH/DIAG IV INF INIT: CPT

## 2022-05-21 PROCEDURE — 99223 1ST HOSP IP/OBS HIGH 75: CPT | Mod: AI | Performed by: INTERNAL MEDICINE

## 2022-05-21 PROCEDURE — 83880 ASSAY OF NATRIURETIC PEPTIDE: CPT

## 2022-05-21 PROCEDURE — 700101 HCHG RX REV CODE 250: Performed by: INTERNAL MEDICINE

## 2022-05-21 PROCEDURE — 94760 N-INVAS EAR/PLS OXIMETRY 1: CPT

## 2022-05-21 PROCEDURE — 93005 ELECTROCARDIOGRAM TRACING: CPT

## 2022-05-21 PROCEDURE — 71045 X-RAY EXAM CHEST 1 VIEW: CPT

## 2022-05-21 PROCEDURE — 700111 HCHG RX REV CODE 636 W/ 250 OVERRIDE (IP): Performed by: INTERNAL MEDICINE

## 2022-05-21 PROCEDURE — 80053 COMPREHEN METABOLIC PANEL: CPT

## 2022-05-21 PROCEDURE — 82570 ASSAY OF URINE CREATININE: CPT

## 2022-05-21 PROCEDURE — 94660 CPAP INITIATION&MGMT: CPT

## 2022-05-21 PROCEDURE — 81001 URINALYSIS AUTO W/SCOPE: CPT

## 2022-05-21 PROCEDURE — 85007 BL SMEAR W/DIFF WBC COUNT: CPT

## 2022-05-21 PROCEDURE — 700102 HCHG RX REV CODE 250 W/ 637 OVERRIDE(OP): Performed by: EMERGENCY MEDICINE

## 2022-05-21 PROCEDURE — 96375 TX/PRO/DX INJ NEW DRUG ADDON: CPT

## 2022-05-21 PROCEDURE — 96368 THER/DIAG CONCURRENT INF: CPT

## 2022-05-21 PROCEDURE — 700101 HCHG RX REV CODE 250: Performed by: EMERGENCY MEDICINE

## 2022-05-21 PROCEDURE — 770000 HCHG ROOM/CARE - INTERMEDIATE ICU *

## 2022-05-21 PROCEDURE — 99285 EMERGENCY DEPT VISIT HI MDM: CPT

## 2022-05-21 PROCEDURE — 83735 ASSAY OF MAGNESIUM: CPT

## 2022-05-21 PROCEDURE — A9270 NON-COVERED ITEM OR SERVICE: HCPCS | Performed by: EMERGENCY MEDICINE

## 2022-05-21 PROCEDURE — 84443 ASSAY THYROID STIM HORMONE: CPT

## 2022-05-21 PROCEDURE — 99223 1ST HOSP IP/OBS HIGH 75: CPT | Performed by: INTERNAL MEDICINE

## 2022-05-21 PROCEDURE — 700111 HCHG RX REV CODE 636 W/ 250 OVERRIDE (IP): Performed by: EMERGENCY MEDICINE

## 2022-05-21 PROCEDURE — 84484 ASSAY OF TROPONIN QUANT: CPT

## 2022-05-21 RX ORDER — SODIUM CHLORIDE 9 MG/ML
INJECTION, SOLUTION INTRAVENOUS CONTINUOUS
Status: DISCONTINUED | OUTPATIENT
Start: 2022-05-21 | End: 2022-05-22

## 2022-05-21 RX ORDER — LOSARTAN POTASSIUM 50 MG/1
100 TABLET ORAL DAILY
Refills: 3 | Status: DISCONTINUED | OUTPATIENT
Start: 2022-05-22 | End: 2022-05-24 | Stop reason: HOSPADM

## 2022-05-21 RX ORDER — DILTIAZEM HYDROCHLORIDE 5 MG/ML
0.25 INJECTION INTRAVENOUS
Status: COMPLETED | OUTPATIENT
Start: 2022-05-21 | End: 2022-05-21

## 2022-05-21 RX ORDER — ESMOLOL HYDROCHLORIDE 20 MG/ML
0-200 INJECTION, SOLUTION INTRAVENOUS CONTINUOUS
Status: DISCONTINUED | OUTPATIENT
Start: 2022-05-21 | End: 2022-05-22

## 2022-05-21 RX ORDER — ACETAMINOPHEN 325 MG/1
650 TABLET ORAL EVERY 6 HOURS PRN
Status: DISCONTINUED | OUTPATIENT
Start: 2022-05-21 | End: 2022-05-24 | Stop reason: HOSPADM

## 2022-05-21 RX ORDER — HYDROMORPHONE HYDROCHLORIDE 1 MG/ML
0.25 INJECTION, SOLUTION INTRAMUSCULAR; INTRAVENOUS; SUBCUTANEOUS
Status: DISCONTINUED | OUTPATIENT
Start: 2022-05-21 | End: 2022-05-23

## 2022-05-21 RX ORDER — HYDROCODONE BITARTRATE AND ACETAMINOPHEN 5; 325 MG/1; MG/1
.5-1 TABLET ORAL EVERY 6 HOURS PRN
Status: DISCONTINUED | OUTPATIENT
Start: 2022-05-21 | End: 2022-05-22

## 2022-05-21 RX ORDER — LORAZEPAM 2 MG/ML
1 INJECTION INTRAMUSCULAR
Status: DISCONTINUED | OUTPATIENT
Start: 2022-05-21 | End: 2022-05-23

## 2022-05-21 RX ORDER — OXYCODONE HYDROCHLORIDE 5 MG/1
2.5 TABLET ORAL
Status: DISCONTINUED | OUTPATIENT
Start: 2022-05-21 | End: 2022-05-24 | Stop reason: HOSPADM

## 2022-05-21 RX ORDER — METOPROLOL TARTRATE 50 MG/1
100 TABLET, FILM COATED ORAL 2 TIMES DAILY
Status: DISCONTINUED | OUTPATIENT
Start: 2022-05-21 | End: 2022-05-24 | Stop reason: HOSPADM

## 2022-05-21 RX ORDER — SODIUM CHLORIDE 9 MG/ML
1000 INJECTION, SOLUTION INTRAVENOUS ONCE
Status: DISCONTINUED | OUTPATIENT
Start: 2022-05-21 | End: 2022-05-21

## 2022-05-21 RX ORDER — HYDROCODONE BITARTRATE AND ACETAMINOPHEN 5; 325 MG/1; MG/1
.5-1 TABLET ORAL EVERY 4 HOURS PRN
COMMUNITY

## 2022-05-21 RX ORDER — LORAZEPAM 1 MG/1
1 TABLET ORAL EVERY 4 HOURS PRN
Status: DISCONTINUED | OUTPATIENT
Start: 2022-05-21 | End: 2022-05-23

## 2022-05-21 RX ORDER — FOLIC ACID 1 MG/1
1 TABLET ORAL DAILY
Status: DISCONTINUED | OUTPATIENT
Start: 2022-05-22 | End: 2022-05-24 | Stop reason: HOSPADM

## 2022-05-21 RX ORDER — POLYETHYLENE GLYCOL 3350 17 G/17G
1 POWDER, FOR SOLUTION ORAL
Status: DISCONTINUED | OUTPATIENT
Start: 2022-05-21 | End: 2022-05-24 | Stop reason: HOSPADM

## 2022-05-21 RX ORDER — FUROSEMIDE 20 MG/1
20 TABLET ORAL
COMMUNITY

## 2022-05-21 RX ORDER — SODIUM CHLORIDE 9 MG/ML
INJECTION, SOLUTION INTRAVENOUS CONTINUOUS
Status: DISCONTINUED | OUTPATIENT
Start: 2022-05-21 | End: 2022-05-21

## 2022-05-21 RX ORDER — METOPROLOL TARTRATE 100 MG/1
100 TABLET ORAL 2 TIMES DAILY
Status: ON HOLD | COMMUNITY
End: 2022-05-24 | Stop reason: SDUPTHER

## 2022-05-21 RX ORDER — VITAMIN B COMPLEX
2000 TABLET ORAL DAILY
Status: DISCONTINUED | OUTPATIENT
Start: 2022-05-22 | End: 2022-05-24 | Stop reason: HOSPADM

## 2022-05-21 RX ORDER — FUROSEMIDE 10 MG/ML
20 INJECTION INTRAMUSCULAR; INTRAVENOUS ONCE
Status: DISCONTINUED | OUTPATIENT
Start: 2022-05-21 | End: 2022-05-21

## 2022-05-21 RX ORDER — LORAZEPAM 2 MG/1
4 TABLET ORAL
Status: DISCONTINUED | OUTPATIENT
Start: 2022-05-21 | End: 2022-05-23

## 2022-05-21 RX ORDER — LORAZEPAM 2 MG/1
2 TABLET ORAL
Status: DISCONTINUED | OUTPATIENT
Start: 2022-05-21 | End: 2022-05-23

## 2022-05-21 RX ORDER — LORAZEPAM 2 MG/ML
2 INJECTION INTRAMUSCULAR
Status: DISCONTINUED | OUTPATIENT
Start: 2022-05-21 | End: 2022-05-23

## 2022-05-21 RX ORDER — HYDRALAZINE HYDROCHLORIDE 20 MG/ML
10 INJECTION INTRAMUSCULAR; INTRAVENOUS EVERY 4 HOURS PRN
Status: DISCONTINUED | OUTPATIENT
Start: 2022-05-21 | End: 2022-05-24 | Stop reason: HOSPADM

## 2022-05-21 RX ORDER — OXYCODONE HYDROCHLORIDE 5 MG/1
5 TABLET ORAL
Status: DISCONTINUED | OUTPATIENT
Start: 2022-05-21 | End: 2022-05-24 | Stop reason: HOSPADM

## 2022-05-21 RX ORDER — LORAZEPAM 2 MG/ML
0.5 INJECTION INTRAMUSCULAR EVERY 4 HOURS PRN
Status: DISCONTINUED | OUTPATIENT
Start: 2022-05-21 | End: 2022-05-23

## 2022-05-21 RX ORDER — ROSUVASTATIN CALCIUM 10 MG/1
5 TABLET, COATED ORAL EVERY EVENING
Status: DISCONTINUED | OUTPATIENT
Start: 2022-05-21 | End: 2022-05-24 | Stop reason: HOSPADM

## 2022-05-21 RX ORDER — LORAZEPAM 2 MG/ML
1.5 INJECTION INTRAMUSCULAR
Status: DISCONTINUED | OUTPATIENT
Start: 2022-05-21 | End: 2022-05-23

## 2022-05-21 RX ORDER — GAUZE BANDAGE 2" X 2"
100 BANDAGE TOPICAL DAILY
Status: DISCONTINUED | OUTPATIENT
Start: 2022-05-22 | End: 2022-05-21

## 2022-05-21 RX ORDER — AMOXICILLIN 250 MG
2 CAPSULE ORAL 2 TIMES DAILY
Status: DISCONTINUED | OUTPATIENT
Start: 2022-05-21 | End: 2022-05-24 | Stop reason: HOSPADM

## 2022-05-21 RX ORDER — MAGNESIUM SULFATE HEPTAHYDRATE 40 MG/ML
2 INJECTION, SOLUTION INTRAVENOUS ONCE
Status: COMPLETED | OUTPATIENT
Start: 2022-05-21 | End: 2022-05-21

## 2022-05-21 RX ORDER — BISACODYL 10 MG
10 SUPPOSITORY, RECTAL RECTAL
Status: DISCONTINUED | OUTPATIENT
Start: 2022-05-21 | End: 2022-05-24 | Stop reason: HOSPADM

## 2022-05-21 RX ORDER — LORAZEPAM 0.5 MG/1
0.5 TABLET ORAL EVERY 4 HOURS PRN
Status: DISCONTINUED | OUTPATIENT
Start: 2022-05-21 | End: 2022-05-23

## 2022-05-21 RX ORDER — METOPROLOL TARTRATE 1 MG/ML
5 INJECTION, SOLUTION INTRAVENOUS ONCE
Status: DISPENSED | OUTPATIENT
Start: 2022-05-21 | End: 2022-05-22

## 2022-05-21 RX ADMIN — THIAMINE HYDROCHLORIDE: 100 INJECTION, SOLUTION INTRAMUSCULAR; INTRAVENOUS at 22:47

## 2022-05-21 RX ADMIN — SENNOSIDES AND DOCUSATE SODIUM 2 TABLET: 50; 8.6 TABLET ORAL at 20:52

## 2022-05-21 RX ADMIN — MAGNESIUM SULFATE HEPTAHYDRATE 2 G: 40 INJECTION, SOLUTION INTRAVENOUS at 19:03

## 2022-05-21 RX ADMIN — METOPROLOL TARTRATE 100 MG: 50 TABLET, FILM COATED ORAL at 20:51

## 2022-05-21 RX ADMIN — SODIUM CHLORIDE: 9 INJECTION, SOLUTION INTRAVENOUS at 20:30

## 2022-05-21 RX ADMIN — ROSUVASTATIN CALCIUM 5 MG: 10 TABLET, FILM COATED ORAL at 20:52

## 2022-05-21 RX ADMIN — ESMOLOL HYDROCHLORIDE 50 MCG/KG/MIN: 20 INJECTION INTRAVENOUS at 17:37

## 2022-05-21 RX ADMIN — DILTIAZEM HYDROCHLORIDE 24.95 MG: 5 INJECTION INTRAVENOUS at 16:03

## 2022-05-21 RX ADMIN — THIAMINE HYDROCHLORIDE 500 MG: 100 INJECTION, SOLUTION INTRAMUSCULAR; INTRAVENOUS at 20:53

## 2022-05-21 RX ADMIN — DILTIAZEM HYDROCHLORIDE 24.95 MG: 5 INJECTION INTRAVENOUS at 16:25

## 2022-05-21 RX ADMIN — ESMOLOL HYDROCHLORIDE 50 MCG/KG/MIN: 20 INJECTION INTRAVENOUS at 22:48

## 2022-05-21 RX ADMIN — APIXABAN 5 MG: 5 TABLET, FILM COATED ORAL at 19:07

## 2022-05-21 RX ADMIN — SODIUM CHLORIDE: 9 INJECTION, SOLUTION INTRAVENOUS at 17:30

## 2022-05-21 RX ADMIN — DILTIAZEM HYDROCHLORIDE 24.95 MG: 5 INJECTION INTRAVENOUS at 16:40

## 2022-05-21 ASSESSMENT — ENCOUNTER SYMPTOMS
HEADACHES: 0
VOMITING: 0
BLURRED VISION: 0
INSOMNIA: 0
DEPRESSION: 0
DIZZINESS: 0
FEVER: 0
COUGH: 1
HEMOPTYSIS: 0
PALPITATIONS: 1
MYALGIAS: 0
NECK PAIN: 0
SORE THROAT: 0
SHORTNESS OF BREATH: 1
STRIDOR: 0
PND: 1
NAUSEA: 0
ORTHOPNEA: 1
DOUBLE VISION: 0
WEIGHT LOSS: 0
BRUISES/BLEEDS EASILY: 0

## 2022-05-21 ASSESSMENT — CHA2DS2 SCORE
DIABETES: NO
CHA2DS2 VASC SCORE: 3
PRIOR STROKE OR TIA OR THROMBOEMBOLISM: NO
AGE 75 OR GREATER: YES
VASCULAR DISEASE: NO
HYPERTENSION: YES
CHF OR LEFT VENTRICULAR DYSFUNCTION: NO
SEX: MALE
AGE 65 TO 74: NO

## 2022-05-21 ASSESSMENT — FIBROSIS 4 INDEX
FIB4 SCORE: .989743318610787025
FIB4 SCORE: 10.02

## 2022-05-21 ASSESSMENT — LIFESTYLE VARIABLES
ANXIETY: NO ANXIETY (AT EASE)
DO YOU DRINK ALCOHOL: YES
HEADACHE, FULLNESS IN HEAD: NOT PRESENT
NAUSEA AND VOMITING: NO NAUSEA AND NO VOMITING
PAROXYSMAL SWEATS: NO SWEAT VISIBLE
AUDITORY DISTURBANCES: NOT PRESENT
AGITATION: NORMAL ACTIVITY
ORIENTATION AND CLOUDING OF SENSORIUM: ORIENTED AND CAN DO SERIAL ADDITIONS
TOTAL SCORE: 0
VISUAL DISTURBANCES: NOT PRESENT
TREMOR: NO TREMOR

## 2022-05-21 ASSESSMENT — PAIN DESCRIPTION - PAIN TYPE: TYPE: ACUTE PAIN

## 2022-05-21 NOTE — ED PROVIDER NOTES
ED Provider Note    CHIEF COMPLAINT  Chief Complaint   Patient presents with   • ALOC     Family called EMS from home with concern for aloc and fatigue       HPI  Wilfredo Kohler Jr. is a 77 y.o. male who presents with a past medical history significant for prostate cancer, hypertension, his family called the paramedics because he was confused and tired.  Upon arrival he was found to be tachycardic.  The patient here denies any chest pain, he denies feeling palpitations.  He when asked thinks the year is .  He does tell me that it is Saturday which is true.  And he tells me that he is at [facility].  Further HPI cannot be obtained for the patient secondary to his altered mental status.    REVIEW OF SYSTEMS  Review of systems cannot be obtained second the patient's altered mental status.    PAST MEDICAL HISTORY   has a past medical history of Abdominal pain, Chickenpox, Cough, Daytime sleepiness, Dizziness, Frequent urination, [place] measles, Hypertension, Influenza, Painful urination, Prostate cancer (HCC), Shortness of breath, Snoring, Swelling of lower extremity, and Wears glasses.    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    CURRENT MEDICATIONS  Home Medications     Reviewed by Promise Quintero (Pharmacy Tech) on 22 at 1650  Med List Status: Complete   Medication Last Dose Status  "  allopurinol (ZYLOPRIM) 300 MG Tab 5/20/2022 Active   Cholecalciferol (VITAMIN D3) 50 MCG (2000 UT) Tab 5/20/2022 Active   Cyanocobalamin (VITAMIN B12 PO) 5/20/2022 Active   FOLIC ACID PO 5/20/2022 Active   furosemide (LASIX) 20 MG Tab 5/20/2022 Active   HYDROcodone-acetaminophen (NORCO) 5-325 MG Tab per tablet 5/21/2022 Active   losartan (COZAAR) 100 MG Tab 5/20/2022 Active   metoprolol tartrate (LOPRESSOR) 100 MG Tab 5/20/2022 Active   potassium chloride ER (KLOR-CON) 10 MEQ tablet FEW DAYS AGO Active   rosuvastatin (CRESTOR) 5 MG Tab 5/20/2022 Active   TURMERIC PO FEW DAYS AGO Active                  ALLERGIES  No Known Allergies    FAMILY HISTORY  No pertinent family history    PHYSICAL EXAM  VITAL SIGNS: /66   Pulse (!) 156   Temp 36.7 °C (98.1 °F) (Temporal)   Resp 20   Ht 1.676 m (5' 6\")   Wt 99.8 kg (220 lb)   SpO2 92%   BMI 35.51 kg/m²  @JV[[id number]::@   Pulse ox interpretation: I interpret this pulse ox as normal.  Constitutional: Alert, confused.  HENT: No signs of trauma, Bilateral external ears normal, Nose normal.   Eyes: Pupils are equal and reactive, Conjunctiva normal, Non-icteric.   Neck: Normal range of motion, No tenderness, Supple, No stridor.   Lymphatic: No lymphadenopathy noted.   Cardiovascular: Tachycardic, irregularly irregular.  Thorax & Lungs: Normal breath sounds, No respiratory distress, No wheezing, No chest tenderness.   Abdomen: Bowel sounds normal, Soft, No tenderness, No masses, No pulsatile masses. No peritoneal signs.  Skin: Warm, Dry, No erythema, No rash.   Extremities: Intact distal pulses, No edema, No tenderness, No cyanosis.  Musculoskeletal: Good range of motion in all major joints. No tenderness to palpation or major deformities noted.   Neurologic: Alert , Normal motor function, Normal sensory function, No focal deficits noted.   Psychiatric: Affect normal, Judgment normal, Mood normal.       DIAGNOSTIC STUDIES / PROCEDURES    EKG  Atrial fibrillation with " rapid V-rate 176  Ventricular premature complex  Right axis deviation  Low voltage, extremity leads  Poor R wave progression anteriorly  Compared to ECG 01/11/2022 15:21:10  Ventricular premature complex(es) now present  Low QRS voltage now present  Nonspecific changes  A. fib instead of sinus bradycardia  My impression of this EKG, A. fib with rapid ventricular response, does not meet STEMI criteria at this time      LABS  Labs Reviewed   URINALYSIS - Abnormal; Notable for the following components:       Result Value    Character Cloudy (*)     All other components within normal limits   CBC WITH DIFFERENTIAL - Abnormal; Notable for the following components:    WBC 4.1 (*)     RBC 3.42 (*)     Hemoglobin 11.0 (*)     Hematocrit 31.4 (*)     Platelet Count 122 (*)     Lymphs (Absolute) 0.97 (*)     All other components within normal limits   COMP METABOLIC PANEL - Abnormal; Notable for the following components:    Sodium 125 (*)     Co2 18 (*)     Glucose 150 (*)     Bun 26 (*)     Calcium 8.3 (*)     AST(SGOT) 90 (*)     Alkaline Phosphatase 332 (*)     Albumin 2.6 (*)     Total Protein 5.4 (*)     All other components within normal limits   PROBRAIN NATRIURETIC PEPTIDE, NT - Abnormal; Notable for the following components:    NT-proBNP 514 (*)     All other components within normal limits   TROPONIN - Abnormal; Notable for the following components:    Troponin T 22 (*)     All other components within normal limits   MAGNESIUM - Abnormal; Notable for the following components:    Magnesium 1.4 (*)     All other components within normal limits   URINE MICROSCOPIC (W/UA) - Abnormal; Notable for the following components:    WBC 5-10 (*)     RBC 2-5 (*)     Hyaline Cast 6-10 (*)     All other components within normal limits   PHOSPHORUS   TSH   ESTIMATED GFR   DIFFERENTIAL MANUAL   PERIPHERAL SMEAR REVIEW   PLATELET ESTIMATE   MORPHOLOGY         RADIOLOGY  CT-HEAD W/O   Final Result         1. No acute intracranial  abnormality. No evidence of acute intracranial hemorrhage or mass lesion.                     DX-CHEST-PORTABLE (1 VIEW)   Final Result         1. Low lung volume with hypoventilatory change.      EC-ECHOCARDIOGRAM COMPLETE W/ CONT    (Results Pending)           COURSE & MEDICAL DECISION MAKING  Pertinent Labs & Imaging studies reviewed. (See chart for details)    The patient presents with A. fib with RVR.  I do not see this in his history.  He will be rate controlled with diltiazem.  I have ordered labs to evaluate.  I have ordered a CT scan to evaluate for altered mental status.  Differential diagnose include CVA, dehydration, urinary tract infection, pneumonia, electrolyte abnormality    Patient's labs indicate hyponatremia, head CT is negative for acute disease.  The patient had been given IV diltiazem by paramedics, his rate is still 150.  I gave him 3 doses of IV diltiazem and this did not improve his rate.  I have started the patient on normal saline to slowly replete his sodium.    Spoke with cardiologist Dr. Alston who agrees with esmolol drip.  He would also like the patient to be anticoagulated on Eliquis.  I agree with this plan as well.  I have ordered Eliquis.    I spoke with Edgar Watkins the hospitalist who will assess the patient for hospitalization, he will need the stepdown unit because he is on IV esmolol.    I paged the intensivist.    I spoke with Dr. Caballero the intensivist who will consult on the patient.    The total critical care time on this patient is 40 minutes, resuscitating patient, speaking with admitting physician, and deciphering test results. This 40 minutes is exclusive of separately billable procedures.      FINAL IMPRESSION  1. Atrial fibrillation with rapid ventricular response (HCC)     2. Hyponatremia     3. Altered mental status, unspecified altered mental status type         Total critical care time 40 minutes as outlined above      Electronically signed by: Vaughn Silveira  RAISA Campos, 5/21/2022 3:15 PM

## 2022-05-21 NOTE — ED NOTES
Med rec completed per pt, family, and med list (returned)  Allergies reviewed  No PO antibiotics in the last 30 days

## 2022-05-21 NOTE — ED TRIAGE NOTES
"Chief Complaint   Patient presents with   • ALOC     Family called EMS from home with concern for aloc and fatigue       Pt BIB EMS with the above complaint. Upon arrival EMS noted pt to be in afib with RVR rate as high as 180's. Gave 20mg diltiazem and 500mL NS. Rate reportedly improved, but upon arrival rate fluctuates between 160's-180's.     /68   Pulse (!) 124   Temp 36.7 °C (98.1 °F) (Temporal)   Resp 20   Ht 1.676 m (5' 6\")   Wt 99.8 kg (220 lb)   SpO2 92%   BMI 35.51 kg/m²       "

## 2022-05-22 LAB
ANION GAP SERPL CALC-SCNC: 7 MMOL/L (ref 7–16)
BUN SERPL-MCNC: 22 MG/DL (ref 8–22)
CALCIUM SERPL-MCNC: 7.8 MG/DL (ref 8.5–10.5)
CHLORIDE SERPL-SCNC: 96 MMOL/L (ref 96–112)
CHLORIDE UR-SCNC: <20 MMOL/L
CO2 SERPL-SCNC: 21 MMOL/L (ref 20–33)
CREAT SERPL-MCNC: 0.56 MG/DL (ref 0.5–1.4)
ERYTHROCYTE [DISTWIDTH] IN BLOOD BY AUTOMATED COUNT: 45.7 FL (ref 35.9–50)
GFR SERPLBLD CREATININE-BSD FMLA CKD-EPI: 101 ML/MIN/1.73 M 2
GLUCOSE SERPL-MCNC: 99 MG/DL (ref 65–99)
HCT VFR BLD AUTO: 28.3 % (ref 42–52)
HGB BLD-MCNC: 9.6 G/DL (ref 14–18)
MAGNESIUM SERPL-MCNC: 1.9 MG/DL (ref 1.5–2.5)
MCH RBC QN AUTO: 31.8 PG (ref 27–33)
MCHC RBC AUTO-ENTMCNC: 33.9 G/DL (ref 33.7–35.3)
MCV RBC AUTO: 93.7 FL (ref 81.4–97.8)
PHOSPHATE SERPL-MCNC: 4.3 MG/DL (ref 2.5–4.5)
PLATELET # BLD AUTO: 102 K/UL (ref 164–446)
PMV BLD AUTO: 9.6 FL (ref 9–12.9)
POTASSIUM SERPL-SCNC: 4 MMOL/L (ref 3.6–5.5)
PREALB SERPL-MCNC: <3 MG/DL (ref 18–38)
RBC # BLD AUTO: 3.02 M/UL (ref 4.7–6.1)
SODIUM SERPL-SCNC: 124 MMOL/L (ref 135–145)
SODIUM UR-SCNC: <20 MMOL/L
WBC # BLD AUTO: 2.7 K/UL (ref 4.8–10.8)

## 2022-05-22 PROCEDURE — 83735 ASSAY OF MAGNESIUM: CPT

## 2022-05-22 PROCEDURE — 700102 HCHG RX REV CODE 250 W/ 637 OVERRIDE(OP): Performed by: HOSPITALIST

## 2022-05-22 PROCEDURE — 85027 COMPLETE CBC AUTOMATED: CPT

## 2022-05-22 PROCEDURE — 80048 BASIC METABOLIC PNL TOTAL CA: CPT

## 2022-05-22 PROCEDURE — A9270 NON-COVERED ITEM OR SERVICE: HCPCS | Performed by: INTERNAL MEDICINE

## 2022-05-22 PROCEDURE — A9270 NON-COVERED ITEM OR SERVICE: HCPCS | Performed by: HOSPITALIST

## 2022-05-22 PROCEDURE — 770000 HCHG ROOM/CARE - INTERMEDIATE ICU *

## 2022-05-22 PROCEDURE — 700102 HCHG RX REV CODE 250 W/ 637 OVERRIDE(OP): Performed by: INTERNAL MEDICINE

## 2022-05-22 PROCEDURE — 99233 SBSQ HOSP IP/OBS HIGH 50: CPT | Performed by: INTERNAL MEDICINE

## 2022-05-22 PROCEDURE — 84100 ASSAY OF PHOSPHORUS: CPT

## 2022-05-22 PROCEDURE — 99233 SBSQ HOSP IP/OBS HIGH 50: CPT | Performed by: HOSPITALIST

## 2022-05-22 PROCEDURE — 84134 ASSAY OF PREALBUMIN: CPT

## 2022-05-22 RX ORDER — AMIODARONE HYDROCHLORIDE 200 MG/1
400 TABLET ORAL TWICE DAILY
Status: DISCONTINUED | OUTPATIENT
Start: 2022-05-22 | End: 2022-05-24 | Stop reason: HOSPADM

## 2022-05-22 RX ORDER — LANOLIN ALCOHOL/MO/W.PET/CERES
400 CREAM (GRAM) TOPICAL DAILY
Status: DISCONTINUED | OUTPATIENT
Start: 2022-05-22 | End: 2022-05-24 | Stop reason: HOSPADM

## 2022-05-22 RX ORDER — AMIODARONE HYDROCHLORIDE 200 MG/1
200 TABLET ORAL DAILY
Status: DISCONTINUED | OUTPATIENT
Start: 2022-06-06 | End: 2022-05-24 | Stop reason: HOSPADM

## 2022-05-22 RX ORDER — AMIODARONE HYDROCHLORIDE 200 MG/1
400 TABLET ORAL DAILY
Status: DISCONTINUED | OUTPATIENT
Start: 2022-05-30 | End: 2022-05-24 | Stop reason: HOSPADM

## 2022-05-22 RX ADMIN — SENNOSIDES AND DOCUSATE SODIUM 2 TABLET: 50; 8.6 TABLET ORAL at 05:29

## 2022-05-22 RX ADMIN — APIXABAN 5 MG: 5 TABLET, FILM COATED ORAL at 17:57

## 2022-05-22 RX ADMIN — Medication 400 MG: at 17:58

## 2022-05-22 RX ADMIN — AMIODARONE HYDROCHLORIDE 400 MG: 200 TABLET ORAL at 16:07

## 2022-05-22 RX ADMIN — THERA TABS 1 TABLET: TAB at 05:29

## 2022-05-22 RX ADMIN — METOPROLOL TARTRATE 100 MG: 50 TABLET, FILM COATED ORAL at 17:58

## 2022-05-22 RX ADMIN — ASPIRIN 81 MG: 81 TABLET, COATED ORAL at 05:29

## 2022-05-22 RX ADMIN — METOPROLOL TARTRATE 100 MG: 50 TABLET, FILM COATED ORAL at 05:28

## 2022-05-22 RX ADMIN — Medication 2000 UNITS: at 05:28

## 2022-05-22 RX ADMIN — LOSARTAN POTASSIUM 100 MG: 50 TABLET, FILM COATED ORAL at 05:29

## 2022-05-22 RX ADMIN — ROSUVASTATIN CALCIUM 5 MG: 10 TABLET, FILM COATED ORAL at 17:57

## 2022-05-22 RX ADMIN — APIXABAN 5 MG: 5 TABLET, FILM COATED ORAL at 05:28

## 2022-05-22 RX ADMIN — SENNOSIDES AND DOCUSATE SODIUM 2 TABLET: 50; 8.6 TABLET ORAL at 17:58

## 2022-05-22 RX ADMIN — FOLIC ACID 1 MG: 1 TABLET ORAL at 05:29

## 2022-05-22 ASSESSMENT — LIFESTYLE VARIABLES
ORIENTATION AND CLOUDING OF SENSORIUM: ORIENTED AND CAN DO SERIAL ADDITIONS
NAUSEA AND VOMITING: NO NAUSEA AND NO VOMITING
DOES PATIENT WANT TO STOP DRINKING: YES
AGITATION: NORMAL ACTIVITY
VISUAL DISTURBANCES: NOT PRESENT
TOTAL SCORE: 2
PAROXYSMAL SWEATS: NO SWEAT VISIBLE
AUDITORY DISTURBANCES: NOT PRESENT
TREMOR: NO TREMOR
TOTAL SCORE: 0
AGITATION: NORMAL ACTIVITY
HEADACHE, FULLNESS IN HEAD: NOT PRESENT
VISUAL DISTURBANCES: NOT PRESENT
EVER FELT BAD OR GUILTY ABOUT YOUR DRINKING: NO
ANXIETY: NO ANXIETY (AT EASE)
AGITATION: NORMAL ACTIVITY
TREMOR: NO TREMOR
VISUAL DISTURBANCES: NOT PRESENT
HAVE PEOPLE ANNOYED YOU BY CRITICIZING YOUR DRINKING: YES
ALCOHOL_USE: YES
TOTAL SCORE: 0
AVERAGE NUMBER OF DAYS PER WEEK YOU HAVE A DRINK CONTAINING ALCOHOL: 0
NAUSEA AND VOMITING: NO NAUSEA AND NO VOMITING
TREMOR: NO TREMOR
ORIENTATION AND CLOUDING OF SENSORIUM: ORIENTED AND CAN DO SERIAL ADDITIONS
TOTAL SCORE: 2
PAROXYSMAL SWEATS: NO SWEAT VISIBLE
TREMOR: NO TREMOR
ANXIETY: NO ANXIETY (AT EASE)
HEADACHE, FULLNESS IN HEAD: NOT PRESENT
ON A TYPICAL DAY WHEN YOU DRINK ALCOHOL HOW MANY DRINKS DO YOU HAVE: 3
ORIENTATION AND CLOUDING OF SENSORIUM: ORIENTED AND CAN DO SERIAL ADDITIONS
PAROXYSMAL SWEATS: NO SWEAT VISIBLE
EVER HAD A DRINK FIRST THING IN THE MORNING TO STEADY YOUR NERVES TO GET RID OF A HANGOVER: NO
AUDITORY DISTURBANCES: NOT PRESENT
DOES PATIENT WANT TO TALK TO SOMEONE ABOUT QUITTING: NO
AUDITORY DISTURBANCES: NOT PRESENT
HAVE YOU EVER FELT YOU SHOULD CUT DOWN ON YOUR DRINKING: YES
AGITATION: NORMAL ACTIVITY
TOTAL SCORE: 2
CONSUMPTION TOTAL: POSITIVE
ANXIETY: NO ANXIETY (AT EASE)
TOTAL SCORE: 0
ORIENTATION AND CLOUDING OF SENSORIUM: ORIENTED AND CAN DO SERIAL ADDITIONS
HOW MANY TIMES IN THE PAST YEAR HAVE YOU HAD 5 OR MORE DRINKS IN A DAY: 3
VISUAL DISTURBANCES: NOT PRESENT
TOTAL SCORE: 0
PAROXYSMAL SWEATS: NO SWEAT VISIBLE
AUDITORY DISTURBANCES: NOT PRESENT
NAUSEA AND VOMITING: NO NAUSEA AND NO VOMITING
NAUSEA AND VOMITING: NO NAUSEA AND NO VOMITING
HEADACHE, FULLNESS IN HEAD: NOT PRESENT
ANXIETY: NO ANXIETY (AT EASE)
HEADACHE, FULLNESS IN HEAD: NOT PRESENT

## 2022-05-22 ASSESSMENT — ENCOUNTER SYMPTOMS
NAUSEA: 0
WEAKNESS: 1
BACK PAIN: 0
BLURRED VISION: 0
SORE THROAT: 0
DOUBLE VISION: 0
VOMITING: 0
LOSS OF CONSCIOUSNESS: 0
ABDOMINAL PAIN: 0
SHORTNESS OF BREATH: 0
PALPITATIONS: 0
COUGH: 0
CHILLS: 0
HEADACHES: 0
DIARRHEA: 0
DIZZINESS: 0
FEVER: 0

## 2022-05-22 ASSESSMENT — PATIENT HEALTH QUESTIONNAIRE - PHQ9
2. FEELING DOWN, DEPRESSED, IRRITABLE, OR HOPELESS: NOT AT ALL
SUM OF ALL RESPONSES TO PHQ9 QUESTIONS 1 AND 2: 0
2. FEELING DOWN, DEPRESSED, IRRITABLE, OR HOPELESS: NOT AT ALL
1. LITTLE INTEREST OR PLEASURE IN DOING THINGS: NOT AT ALL
1. LITTLE INTEREST OR PLEASURE IN DOING THINGS: NOT AT ALL
SUM OF ALL RESPONSES TO PHQ9 QUESTIONS 1 AND 2: 0

## 2022-05-22 NOTE — ASSESSMENT & PLAN NOTE
SG on UA on admission 1.019  Clinically euvolemic  ?SIADH vs malnourished  Send USom and Beck  Follow closely  conts to correct without active intervention

## 2022-05-22 NOTE — CONSULTS
PULMONARY AND CRITICAL CARE MEDICINE CONSULTATION    Date of Consultation:  5/21/2022    Requesting Physician:  Vaughn Rivera MD    Consulting Physician:  Leroy James MD    Reason for Consultation: Atrial fibrillation with rapid ventricular response    Chief Complaint: Atrial fibrillation with rapid ventricular response    History of Present Illness:    I was kindly asked to see and evaluate Wilfredo Kohler Jr., a 77 y.o. male for evaluation and management of the above problem.    This gentleman has a history of stage IV prostate carcinoma with bone metastases, pulmonary hypertension, primary hypertension, dyslipidemia, probable LINDY and alcohol abuse.  He comes into the ED with his daughter complaining of worsening weakness as well as confusion.  His daughter tells me that he has had confusion for several days with increasing weakness.  He denies fever, chills or sweats.  He has chronic shortness of breath.  He has no cough or sputum production.  He has no angina, palpitations or syncope.  He has lower extremity edema.  He has had poor oral intake for several days.  He has a long history of regular alcohol use, but stopped drinking 10 days prior to presentation because he did not feel well.    Medications Prior to Admission:    No current facility-administered medications on file prior to encounter.     Current Outpatient Medications on File Prior to Encounter   Medication Sig Dispense Refill   • FOLIC ACID PO Take 1 Tablet by mouth every day.     • furosemide (LASIX) 20 MG Tab Take 20 mg by mouth 1 time a day as needed (Swelling).     • metoprolol tartrate (LOPRESSOR) 100 MG Tab Take 100 mg by mouth 2 times a day.     • HYDROcodone-acetaminophen (NORCO) 5-325 MG Tab per tablet Take 0.5-1 Tablets by mouth every four hours as needed. Indications: Pain     • potassium chloride ER (KLOR-CON) 10 MEQ tablet Take 1 tablet by mouth once daily (Patient taking differently: Take 10 mEq by mouth every  day.) 90 Tablet 0   • rosuvastatin (CRESTOR) 5 MG Tab Take 1 tablet by mouth once daily (Patient taking differently: Take 5 mg by mouth every day.) 90 Tablet 0   • Cyanocobalamin (VITAMIN B12 PO) Take 1 Tablet by mouth every day.     • Cholecalciferol (VITAMIN D3) 50 MCG (2000 UT) Tab Take 2,000 Units by mouth every day.     • TURMERIC PO Take 1 Tablet by mouth every day.     • losartan (COZAAR) 100 MG Tab Take 1 Tablet by mouth every day. 90 Tablet 3   • allopurinol (ZYLOPRIM) 300 MG Tab Take 1 Tablet by mouth every day. 90 Tablet 3       Current Medications:      Current Facility-Administered Medications:   •  Metoprolol Tartrate (LOPRESSOR) injection 5 mg, 5 mg, Intravenous, Once, Vaughn Rivera M.D.  •  esmolol (BREVIBLOC) 2000 mg/100mL premix infusion, 0-200 mcg/kg/min, Intravenous, Continuous, Vaughn Rivera M.D., Stopped at 05/21/22 2047  •  magnesium sulfate IVPB premix 2 g, 2 g, Intravenous, Once, Edgar Watkins M.D., Continue to Floor at 05/21/22 2017  •  [START ON 5/22/2022] vitamin D3 (cholecalciferol) tablet 2,000 Units, 2,000 Units, Oral, DAILY, Edgar Watkins M.D.  •  HYDROcodone-acetaminophen (NORCO) 5-325 MG per tablet 0.5-1 Tablet, 0.5-1 Tablet, Oral, Q6HRS PRN, Edgar Watkins M.D.  •  [START ON 5/22/2022] losartan (COZAAR) tablet 100 mg, 100 mg, Oral, DAILY, Edgar Watkins M.D.  •  metoprolol tartrate (LOPRESSOR) tablet 100 mg, 100 mg, Oral, BID, Edgar Watkins M.D., 100 mg at 05/21/22 2051  •  rosuvastatin (CRESTOR) tablet 5 mg, 5 mg, Oral, Q EVENING, Edgar Watkins M.D., 5 mg at 05/21/22 2052  •  senna-docusate (PERICOLACE or SENOKOT S) 8.6-50 MG per tablet 2 Tablet, 2 Tablet, Oral, BID, 2 Tablet at 05/21/22 2052 **AND** polyethylene glycol/lytes (MIRALAX) PACKET 1 Packet, 1 Packet, Oral, QDAY PRN **AND** magnesium hydroxide (MILK OF MAGNESIA) suspension 30 mL, 30 mL, Oral, QDAY PRN **AND** bisacodyl (DULCOLAX) suppository 10 mg, 10 mg, Rectal, QDAY PRN, Edgar Watkins M.D.  •   acetaminophen (Tylenol) tablet 650 mg, 650 mg, Oral, Q6HRS PRN, Edgar Watkins M.D.  •  Notify provider if pain remains uncontrolled, , , CONTINUOUS **AND** Use the Numeric Rating Scale (NRS), Cox-Baker Faces (WBF), or FLACC on regular floors and Critical-Care Pain Observation Tool (CPOT) on ICUs/Trauma to assess pain, , , CONTINUOUS **AND** Pulse Ox, , , CONTINUOUS **AND** Pharmacy Consult Request ...Pain Management Review 1 Each, 1 Each, Other, PHARMACY TO DOSE **AND** If patient difficult to arouse and/or has respiratory depression (respiratory rate of 10 or less), stop any opiates that are currently infusing and call a Rapid Response., , , CONTINUOUS, Edgar Watkins M.D.  •  oxyCODONE immediate-release (ROXICODONE) tablet 2.5 mg, 2.5 mg, Oral, Q3HRS PRN **OR** oxyCODONE immediate-release (ROXICODONE) tablet 5 mg, 5 mg, Oral, Q3HRS PRN **OR** HYDROmorphone (Dilaudid) injection 0.25 mg, 0.25 mg, Intravenous, Q3HRS PRN, Edgar Watkins M.D.  •  hydrALAZINE (APRESOLINE) injection 10 mg, 10 mg, Intravenous, Q4HRS PRN, Edgar Watkins M.D.  •  [START ON 5/22/2022] aspirin EC (ECOTRIN) tablet 81 mg, 81 mg, Oral, DAILY, Egdar Watkins M.D.  •  LORazepam (ATIVAN) tablet 0.5 mg, 0.5 mg, Oral, Q4HRS PRN, Edgar Watkins M.D.  •  LORazepam (ATIVAN) tablet 1 mg, 1 mg, Oral, Q4HRS PRN **OR** LORazepam (ATIVAN) injection 0.5 mg, 0.5 mg, Intravenous, Q4HRS PRN, Edgar Watkins M.D.  •  LORazepam (ATIVAN) tablet 2 mg, 2 mg, Oral, Q2HRS PRN **OR** LORazepam (ATIVAN) injection 1 mg, 1 mg, Intravenous, Q2HRS PRN, Edgar Watkins M.D.  •  LORazepam (ATIVAN) tablet 3 mg, 3 mg, Oral, Q HOUR PRN **OR** LORazepam (ATIVAN) injection 1.5 mg, 1.5 mg, Intravenous, Q HOUR PRN, Edgar Watkins M.D.  •  LORazepam (ATIVAN) tablet 4 mg, 4 mg, Oral, Q15 MIN PRN **OR** LORazepam (ATIVAN) injection 2 mg, 2 mg, Intravenous, Q15 MIN PRN, Edgar Watkins M.D.  •  detox IV 1000 mL (D5LR + magnesium 1 g + thiamine 100 mg + folic acid 1 mg) infusion,  , Intravenous, Once **AND** [DISCONTINUED] thiamine (Vitamin B-1) tablet 100 mg, 100 mg, Oral, DAILY **AND** [START ON 2022] multivitamin (THERAGRAN) tablet 1 Tablet, 1 Tablet, Oral, DAILY **AND** [START ON 2022] folic acid (FOLVITE) tablet 1 mg, 1 mg, Oral, DAILY, Edgar Watkins M.D.  •  thiamine (B-1) 500 mg in dextrose 5% 100 mL IVPB, 500 mg, Intravenous, DAILY, Leroy James M.D., Last Rate: 200 mL/hr at 22, 500 mg at 22  •  NS infusion, , Intravenous, Continuous, Leroy James M.D., Last Rate: 83 mL/hr at 22, New Bag at 22  •  [START ON 2022] apixaban (ELIQUIS) tablet 5 mg, 5 mg, Oral, BID, Edgar Watkins M.D.    Allergies:    Patient has no known allergies.    Past Surgical History:    Past Surgical History:   Procedure Laterality Date   • VASECTOMY         Past Medical History:    Past Medical History:   Diagnosis Date   • Abdominal pain    • Chickenpox    • Cough    • Daytime sleepiness    • Dizziness    • Frequent urination    • Hebrew measles    • Hypertension    • Influenza    • Painful urination    • Prostate cancer (HCC)    • Shortness of breath    • Snoring    • Swelling of lower extremity    • Wears glasses        Social History:    Social History     Socioeconomic History   • Marital status:      Spouse name: Not on file   • Number of children: Not on file   • Years of education: Not on file   • Highest education level: Not on file   Occupational History   • Not on file   Tobacco Use   • Smoking status: Former Smoker     Years: 10.00     Types: Cigars     Quit date: 1978     Years since quittin.7   • Smokeless tobacco: Never Used   Vaping Use   • Vaping Use: Never used   Substance and Sexual Activity   • Alcohol use: Yes     Comment: bottle of wine a night   • Drug use: Yes     Types: Marijuana     Comment: Gummies 1 every night to sleep   • Sexual activity: Not on file     Comment: ; 2 daughters; 4  "grandkids; 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[place]; moved to [place] from [place] for CA tx   Other Topics Concern   • Not on file   Social History Narrative   • Not on file     Social Determinants of Health     Financial Resource Strain: Not on file   Food Insecurity: Not on file   Transportation Needs: Not on file   Physical Activity: Not on file   Stress: Not on file   Social Connections: Not on file   Intimate Partner Violence: Not on file   Housing Stability: Not on file       Family History:    Family History   Problem Relation Age of Onset   • Alzheimer's Disease Mother    • Cancer Brother         kidney       Review of System:    ROS    Physical Examination:    /57   Pulse 88   Temp (!) 35.6 °C (96.1 °F) (Temporal)   Resp (!) 24   Ht 1.676 m (5' 6\")   Wt 102 kg (224 lb 3.3 oz)   SpO2 96%   BMI 36.19 kg/m²   Physical Exam    Laboratory Data:        Recent Labs     05/21/22  1455   WBC 4.1*   RBC 3.42*   HEMOGLOBIN 11.0*   HEMATOCRIT 31.4*   MCV 91.8   MCH 32.2   MCHC 35.0   RDW 45.2   PLATELETCT 122*   MPV 9.4     Recent Labs     05/21/22  1455   SODIUM 125*   POTASSIUM 4.1   CHLORIDE 96   CO2 18*   GLUCOSE 150*   BUN 26*   CREATININE 0.74   CALCIUM 8.3*                   Imaging:    I personally viewed all the available CXR and CT scan images as well as reviewed the radiology interpretation reports.    CT-HEAD W/O   Final Result         1. No acute intracranial abnormality. No evidence of acute intracranial hemorrhage or mass lesion.                     DX-CHEST-PORTABLE (1 VIEW)   Final Result         1. Low lung volume with hypoventilatory change.      EC-ECHOCARDIOGRAM COMPLETE W/ CONT    (Results Pending)       Assessment and Plan:    * Atrial fibrillation with rapid ventricular response (HCC)  Assessment & Plan  Begin anticoagulation with apixaban  I am titrating an esmolol drip to achieve heart rate control  Echocardiogram  Check thyroid function  Optimize potassium and magnesium    Alcohol " abuse  Assessment & Plan  History of significant daily alcohol use  Reportedly stopped drinking 10 days prior to presentation  High-dose IV thiamine and supplemental vitamins  Observe for evidence of withdrawal    Acute encephalopathy  Assessment & Plan  Head CT negative for acute pathology  Suspect acute metabolic encephalopathy  Limit sedatives and mind altering medications  Follow neuro exam    Hyponatremia  Assessment & Plan  Gentle hydration  Check urine electrolytes    Pulmonary hypertension (HCC)- (present on admission)  Assessment & Plan  History of pulmonary hypertension with RSVP of 60 mmHg in 9/21  Normalization of pulmonary artery pressures by echocardiography in 1/22  Repeat echocardiogram    Hypertension- (present on admission)  Assessment & Plan  Goal SBP less than 160  Continue losartan, 100 mg daily  Continue metoprolol tartrate, 100 mg twice daily    Hypomagnesemia  Assessment & Plan  Replete magnesium    LINDY (obstructive sleep apnea)  Assessment & Plan  Provisional diagnosis  He requires an outpatient polysomnogram    Hyperlipidemia- (present on admission)  Assessment & Plan  Continue rosuvastatin    I have assessed and reassessed his blood pressure, hemodynamics and cardiovascular status with titration of an esmolol drip.  He is at increased risk for worsening cardiovascular system dysfunction.    High risk of deterioration and worsening vital organ dysfunction and death without the above critical care interventions.    Thank you for allowing me to participate in the care of this gentleman.    This gentleman is appropriate for admission to the [facility].  Please do not hesitate to contact [facility] if this gentleman develops any deterioration or you have any questions.    The patient is critically ill.  Critical care time = 40 minutes in directly providing and coordinating critical care and extensive data review.  No time overlap and excludes procedures.    Discussed with ER physician,  hospitalist, RN    Leroy James MD  Pulmonary and Critical Care Medicine

## 2022-05-22 NOTE — DIETARY
"Nutrition services: Day 1 of admit.  Wilfredo Kohler Jr. is a 77 y.o. male with admitting DX of Atrial fibrillation with rapid ventricular response. Current hospital problems include: LINDY, hypomagnesemia, hyponatremia, acute encephalopathy, alcohol abuse, hypoalbuminemia, pulmonary hypertension and hyperlipidemia.     Consult received for unintentional wt loss of 2-13 lbs in the past month due to decreased appetite (MST 2). RD visited pt at bedside. Per pt his usual body weight is 220 lbs. He reports a decrease in appetite recently as well as change in the taste of food. He reports his appetite over the past several days has been poor but it was better today and he was able to eat ~ 50% of his breakfast. No reports of nausea or vomiting or issues chewing or swallowing. Pt currently on a high protein diet, RD asked if pt was familiar or wanted to add Boost with trays. Pt reports drinking 2 Boost supplements at home, RD spoke with RN to add to trays to help bolster nutrition while in acute care.       Assessment:  Height: 167.6 cm (5' 6\")  Weight: 102 kg (224 lb 3.3 oz) via bed scale.   Body mass index is 36.19 kg/m²., BMI classification: obese  Diet/Intake: Cardiac high protein 50-75%x1 recorded meal.     Evaluation:   1. Wt loss noted on nutrition screen. Pt states usual body weight as 220 lbs. Pt currently above reported body weight. Per chart review pt is down ~9 lbs (3.8%) in 3 months. No overt signs of muscle or fat wasting noted.   2. Current clinical picture and MD progress notes reviewed   3. Labs 5/22: Na 124 (L), Ca 7.8 (L),   4. Meds: MVI, Folic acid, NS @ 83 mL/hr, senna, thiamine, Vit D3  5. Skin: per MD note: lower extremity edema noted  6. +BM 5/21    Malnutrition Risk: Pt does not meet criteria per ASPEN guidelines     Recommendations/Plan:  1. Cardiac diet with high protein as tolerated  2. Addition of Boost Plus supplements BID    3. Encourage intake of all meals and " supplements  4. Document intake of all meals and supplements  as % taken in ADL's to provide interdisciplinary communication across all shifts.   5. Monitor weight.  6. Nutrition rep will continue to see patient for ongoing meal and snack preferences.     RD to monitor per department policy

## 2022-05-22 NOTE — ASSESSMENT & PLAN NOTE
Begin anticoagulation with apixaban  I am titrating an esmolol drip to achieve heart rate control  Echocardiogram  Check thyroid function  Optimize potassium and magnesium

## 2022-05-22 NOTE — PROGRESS NOTES
[facility] Medicine Daily Progress Note    Date of Service  5/22/2022    Chief Complaint  Wilfredo Kohler Jr. is a 77 y.o. male admitted 5/21/2022 with altered    Hospital Course  78yo PMHx Stg IV prostate CA, HTN, HLD, ETOH, LINDY.  In ED found to be in AFib with RVR, Na 125, Mg 1.4.  Admitted to the ICU on esmolol and amiodarone    Interval Problem Update  Pt feeling better this am.  Daughter at bedside with whom he lives states he seems more alert and like himself, not as confused  RN notes pt requiring max of 2 for mobility    DW treatment plan with pt and his daughter who is at bedside  Pt converted to sinus and is now off esmolol  AFebrile  SBP 110s  4 lNC    I have personally seen and examined the patient at bedside. I discussed the plan of care with patient, family, bedside RN, pharmacy and cardiology.    Consultants/Specialty  cardiology    Code Status  Full Code    Disposition  Patient is not medically cleared for discharge.   Anticipate discharge to to home with close outpatient follow-up.  I have placed the appropriate orders for post-discharge needs.    Review of Systems  Review of Systems   Constitutional: Negative for chills and fever.   HENT: Negative for nosebleeds and sore throat.    Eyes: Negative for blurred vision and double vision.   Respiratory: Negative for cough and shortness of breath.    Cardiovascular: Negative for chest pain, palpitations and leg swelling.   Gastrointestinal: Negative for abdominal pain, diarrhea, nausea and vomiting.   Genitourinary: Negative for dysuria and urgency.   Musculoskeletal: Negative for back pain.   Skin: Negative for rash.   Neurological: Positive for weakness. Negative for dizziness, loss of consciousness and headaches.        Physical Exam  Temp:  [35.6 °C (96.1 °F)-36.7 °C (98.1 °F)] 35.7 °C (96.3 °F)  Pulse:  [] 78  Resp:  [16-39] 22  BP: ()/(51-84) 111/57  SpO2:  [90 %-98 %] 98 %    Physical Exam  Vitals reviewed.   Constitutional:        General: He is not in acute distress.     Appearance: He is well-developed. He is obese. He is not diaphoretic.   HENT:      Head: Normocephalic and atraumatic.   Eyes:      Conjunctiva/sclera: Conjunctivae normal.   Neck:      Vascular: No JVD.   Cardiovascular:      Rate and Rhythm: Normal rate.      Heart sounds: No murmur heard.    No gallop.   Pulmonary:      Effort: Pulmonary effort is normal. No respiratory distress.      Breath sounds: No stridor. No wheezing or rales.   Abdominal:      Palpations: Abdomen is soft.      Tenderness: There is no abdominal tenderness. There is no guarding or rebound.   Musculoskeletal:         General: No tenderness.      Right lower leg: No edema.      Left lower leg: No edema.   Skin:     General: Skin is warm and dry.      Findings: No rash.   Neurological:      General: No focal deficit present.      Mental Status: He is alert and oriented to person, place, and time.   Psychiatric:         Mood and Affect: Mood normal.         Thought Content: Thought content normal.         Fluids    Intake/Output Summary (Last 24 hours) at 5/22/2022 1217  Last data filed at 5/22/2022 1200  Gross per 24 hour   Intake 2366.15 ml   Output 450 ml   Net 1916.15 ml       Laboratory  Recent Labs     05/21/22  1455 05/22/22  0525   WBC 4.1* 2.7*   RBC 3.42* 3.02*   HEMOGLOBIN 11.0* 9.6*   HEMATOCRIT 31.4* 28.3*   MCV 91.8 93.7   MCH 32.2 31.8   MCHC 35.0 33.9   RDW 45.2 45.7   PLATELETCT 122* 102*   MPV 9.4 9.6     Recent Labs     05/21/22  1455 05/22/22  0525   SODIUM 125* 124*   POTASSIUM 4.1 4.0   CHLORIDE 96 96   CO2 18* 21   GLUCOSE 150* 99   BUN 26* 22   CREATININE 0.74 0.56   CALCIUM 8.3* 7.8*                   Imaging  CT-HEAD W/O   Final Result         1. No acute intracranial abnormality. No evidence of acute intracranial hemorrhage or mass lesion.                     DX-CHEST-PORTABLE (1 VIEW)   Final Result         1. Low lung volume with hypoventilatory change.       EC-ECHOCARDIOGRAM COMPLETE W/ CONT    (Results Pending)        Assessment/Plan  * Atrial fibrillation with rapid ventricular response (HCC)  Assessment & Plan  Now back in sinus  Cont po amiodarone load and metoprolol 100  Started apixaban    Hypoalbuminemia  Assessment & Plan  Likely secondary to malnutrition of alcoholism.  Follow-up prealbumin    Alcohol abuse  Assessment & Plan  Supportive care, CIWA protocol  Po Mg, folate, MVI  IV thiamine load    Acute encephalopathy  Assessment & Plan  HypoNa, ETOH withdrawal?  Improved today  Cont to address Na and monitor  Daughter confirms he hasn't drank in several weeks    Hyponatremia  Assessment & Plan  SG on UA on admission 1.019  Clinically euvolemic  ?SIADH vs malnourished  Send USom and Beck  Follow closely  Hold off on active interventions at present    Hypomagnesemia  Assessment & Plan  Follow and replace IV  Start po supplementation    LINDY (obstructive sleep apnea)  Assessment & Plan  Trial CPAP    Pulmonary hypertension (HCC)- (present on admission)  Assessment & Plan  Strongly suggested by exam.  Follow-up echocardiogram    Hypertension- (present on admission)  Assessment & Plan  On metoprolol 100 and losartan 100 as outpt  Cont and monitor    Hyperlipidemia- (present on admission)  Assessment & Plan  Crestor, follow-up lipid profile       VTE prophylaxis: therapeutic anticoagulation with apixaban    I have performed a physical exam and reviewed and updated ROS and Plan today (5/22/2022). In review of yesterday's note (5/21/2022), there are no changes except as documented above.

## 2022-05-22 NOTE — CARE PLAN
The patient is Stable - Low risk of patient condition declining or worsening    Shift Goals  Clinical Goals: maintain electrolyte balance  Patient Goals: stay hydrated  Family Goals: support    Progress made toward(s) clinical / shift goals:    Problem: Knowledge Deficit - Standard  Goal: Patient and family/care givers will demonstrate understanding of plan of care, disease process/condition, diagnostic tests and medications  Outcome: Progressing     Problem: Optimal Care for Alcohol Withdrawal  Goal: Optimal Care for the alcohol withdrawal patient  Outcome: Progressing     Problem: Seizure Precautions  Goal: Implementation of seizure precautions  Outcome: Progressing     Problem: Lifestyle Changes  Goal: Patient's ability to identify lifestyle changes and available resources to help reduce recurrence of condition will improve  Outcome: Progressing     Problem: Psychosocial  Goal: Patient's level of anxiety will decrease  Outcome: Progressing  Goal: Spiritual and cultural needs incorporated into hospitalization  Outcome: Progressing     Problem: Risk for Aspiration  Goal: Patient's risk for aspiration will be absent or decrease  Outcome: Progressing     Problem: Skin Integrity  Goal: Skin integrity is maintained or improved  Outcome: Progressing     Problem: Fall Risk  Goal: Patient will remain free from falls  Outcome: Progressing     Problem: Hemodynamics  Goal: Patient's hemodynamics, fluid balance and neurologic status will be stable or improve  Outcome: Progressing     Problem: Nutrition  Goal: Patient's nutritional and fluid intake will be adequate or improve  Outcome: Progressing     Problem: Urinary Elimination  Goal: Establish and maintain regular urinary output  Outcome: Progressing     Problem: Bowel Elimination  Goal: Establish and maintain regular bowel function  Outcome: Progressing     Problem: Mobility  Goal: Patient's capacity to carry out activities will improve  Outcome: Progressing     Problem:  Self Care  Goal: Patient will have the ability to perform ADLs independently or with assistance (bathe, groom, dress, toilet and feed)  Outcome: Progressing

## 2022-05-22 NOTE — ASSESSMENT & PLAN NOTE
History of significant daily alcohol use  Reportedly stopped drinking 10 days prior to presentation  High-dose IV thiamine and supplemental vitamins  Observe for evidence of withdrawal

## 2022-05-22 NOTE — ED NOTES
Pt transported to [facility] via RN and tech, report given over phone to EVAN Wang, all personal items taken with pt.

## 2022-05-22 NOTE — PROGRESS NOTES
"Cardiology Follow-up Consult Note    Date of Service:    5/22/2022      Consulting Physician: Mike Verduzco M.D.    Name:   Wilfredo Kohler   YOB: 1945  Age:   77 y.o.  male   MRN:   [id number]        Subjective:  Patient converted to sinus rhythm and esmolol drip was turned off.  Is feeling well this morning but is sleepy and fatigued.  Daughter is at bedside who reports no acute concerns.  Does report ongoing weakness and patient's inability to take care of himself.      All other review of systems reviewed and negative.      Past medical, surgical, social, and family history reviewed and unchanged from admission except as noted in assessment and plan.    Medications: Reviewed in MAR      No Known Allergies      Intake/Output Summary (Last 24 hours) at 5/22/2022 1207  Last data filed at 5/22/2022 1000  Gross per 24 hour   Intake 2200.15 ml   Output 450 ml   Net 1750.15 ml        Physical Exam  Body mass index is 36.19 kg/m².  /57   Pulse 78   Temp (!) 35.7 °C (96.3 °F)   Resp (!) 22   Ht 1.676 m (5' 6\")   Wt 102 kg (224 lb 3.3 oz)   SpO2 98%   Vitals:    05/22/22 0900 05/22/22 1000 05/22/22 1100 05/22/22 1200   BP: 125/63 119/59 117/69 111/57   Pulse: 72 61 64 78   Resp: (!) 22 (!) 22 (!) 23 (!) 22   Temp:  (!) 35.7 °C (96.3 °F)     TempSrc:       SpO2: 97% 97% 96% 98%   Weight:       Height:         Oxygen Therapy:  Pulse Oximetry: 98 %, O2 (LPM): 6, O2 Delivery Device: Silicone Nasal Cannula    General: Sleepy but arousable, no acute distress  Eyes: nl conjunctiva  ENT: OP clear  Neck: JVP not elevated, no carotid bruits  Lungs: normal respiratory effort, CTAB  Heart: RRR, no murmurs, no rubs or gallops, no edema bilateral lower extremities. No LV/RV heave on cardiac palpatation. 2+ bilateral radial pulses.  Abdomen: soft, non tender, non distended, no masses, normal bowel sounds.  No HSM.  Extremities/MSK: no clubbing, no cyanosis  Neurological: No focal sensory " deficits  Psychiatric: Appropriate affect, A/O x 3  Skin: Warm extremities    Labs (personally reviewed and notable for):   Lab Results   Component Value Date/Time    SODIUM 124 (L) 05/22/2022 05:25 AM    POTASSIUM 4.0 05/22/2022 05:25 AM    CHLORIDE 96 05/22/2022 05:25 AM    CO2 21 05/22/2022 05:25 AM    GLUCOSE 99 05/22/2022 05:25 AM    BUN 22 05/22/2022 05:25 AM    CREATININE 0.56 05/22/2022 05:25 AM      Lab Results   Component Value Date/Time    WBC 2.7 (L) 05/22/2022 05:25 AM    RBC 3.02 (L) 05/22/2022 05:25 AM    HEMOGLOBIN 9.6 (L) 05/22/2022 05:25 AM    HEMATOCRIT 28.3 (L) 05/22/2022 05:25 AM    MCV 93.7 05/22/2022 05:25 AM    MCH 31.8 05/22/2022 05:25 AM    MCHC 33.9 05/22/2022 05:25 AM    MPV 9.6 05/22/2022 05:25 AM    NEUTSPOLYS 64.60 05/21/2022 02:55 PM    LYMPHOCYTES 23.70 05/21/2022 02:55 PM    MONOCYTES 3.60 05/21/2022 02:55 PM    EOSINOPHILS 1.80 05/21/2022 02:55 PM    BASOPHILS 0.00 05/21/2022 02:55 PM          Lab Results   Component Value Date/Time    TROPONINT 22 (H) 05/21/2022 1455     Lab Results   Component Value Date/Time    NTPROBNP 514 (H) 05/21/2022 1455       Cardiac Imaging and Procedures Review:    ECG: ECG performed 5/22/2022 was interpreted by me which shows A. fib with RVR    24-hour telemetry personally reviewed which shows A. fib with conversion to sinus rhythm and PACs    Echo: Echocardiogram performed on 1/17/2022 was personally interpreted by me which shows normal left ventricular size and function    Radiology test Review:  CT-HEAD W/O   Final Result         1. No acute intracranial abnormality. No evidence of acute intracranial hemorrhage or mass lesion.                     DX-CHEST-PORTABLE (1 VIEW)   Final Result         1. Low lung volume with hypoventilatory change.      EC-ECHOCARDIOGRAM COMPLETE W/ CONT    (Results Pending)       Problem list:  Principal Problem:    Atrial fibrillation with rapid ventricular response (HCC) POA: Unknown  Active Problems:    Hyperlipidemia  POA: Yes    Hypertension POA: Yes    Pulmonary hypertension (HCC) POA: Yes    LINDY (obstructive sleep apnea) POA: Unknown    Hypomagnesemia POA: Unknown    Hyponatremia POA: Unknown    Acute encephalopathy POA: Unknown    Alcohol abuse POA: Unknown    Hypoalbuminemia POA: Unknown  Resolved Problems:    * No resolved hospital problems. *      Impression and Medical Decision Making:  #New onset A. fib  #A. fib with RVR  #Essential hypertension  #Dyslipidemia  #Hyponatremia  #Hypomagnesemia  #Acute encephalopathy 2/2 RVR-resolved  #Alcohol abuse  #Thiamine deficiency  #Hypoalbuminemia  #Frailty    Recommendations:  -- Patient reverted back into sinus rhythm with esmolol drip which has since been discontinued.  Had an extensive discussion with patient and his daughter who is present at bedside regarding initiation of antiarrhythmic agent to prevent future episodes of A. fib with RVR.  Initiate amiodarone load with 400 mg twice daily for 7 days followed by 400 mg daily for 7 days followed by 200 mg daily.  Understands that this is a high risk medication needs follow-up with thyroid, liver and pulmonary function test every 6 months.  Baseline QTC normal.    -- Has already been initiated on anticoagulation with Eliquis 5 mg twice daily which he will continue.  No bleeding concerns noted per daughter.    -- Blood pressure well controlled.  Continue losartan 100 mg daily.    -- For thiamine deficiency, started on replacement therapy per primary team.    -- For debility/frailty, recommend PT/OT consult.  Patient might benefit from SNF prior to discharge home.  Daughter is in agreement.    -- For dyslipidemia, continue rosuvastatin 5 mg daily.      Recommendations discussed with Dr. Burr.    Thank you for allowing me to participate in the care of this patient, cardiology will sign off.  Please contact me with any questions.    Joe Karimi M.D.  Cardiologist, [facility] [place]    [phone]    Please note that this dictation was created using voice recognition software. I have made every reasonable attempt to correct obvious errors, but it is possible there are errors of grammar and possibly content that I did not discover before finalizing the note.

## 2022-05-22 NOTE — H&P
Hospital Medicine History & Physical Note    Date of Service  5/21/2022    Primary Care Physician  JESSY Riddle.    Consultants  critical care    Specialist Names: Esposito    Code Status  Full Code    Chief Complaint  Chief Complaint   Patient presents with   • ALOC     Family called EMS from home with concern for aloc and fatigue       History of Presenting Illness  Wilfredo Kohler Jr. is a 77 y.o. 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   He denies fever, chills or sweats.  He has chronic shortness of breath.  He has no cough or sputum production.  He has no angina, palpitations or syncope.  He has lower extremity edema.  He has had poor oral intake for several days.  He has a long history of regular alcohol use, but stopped drinking 6 days prior to presentation because he did not feel well.  In the emergency department he is found to have atrial fibrillation of 156, hyponatremia, hypomagnesemia, anemia, metabolic acidosis and hypoalbuminemia.  He is referred to the hospitalist for admission.  At bedside he remains somewhat confused, tremulous and his heart rate remains uncontrolled after several doses of IV Cardizem.  He denies a history of alcohol withdrawal but cannot recall the last time he was without alcohol for prolonged period.    I discussed the plan of care with patient, family and bedside RN.    Review of Systems  Review of Systems   Constitutional: Positive for malaise/fatigue. Negative for fever and weight loss.   HENT: Negative for sore throat and tinnitus.    Eyes: Negative for blurred vision and double vision.   Respiratory: Positive for cough and shortness of breath. Negative for hemoptysis and stridor.     Cardiovascular: Positive for chest pain, palpitations, orthopnea, leg swelling and PND.   Gastrointestinal: Negative for nausea and vomiting.   Genitourinary: Negative for dysuria and urgency.   Musculoskeletal: Negative for myalgias and neck pain.   Skin: Negative for itching and rash.   Neurological: Negative for dizziness and headaches.   Endo/Heme/Allergies: Does not bruise/bleed easily.   Psychiatric/Behavioral: Negative for depression. The patient does not have insomnia.        Past Medical History   has a past medical history of Abdominal pain, Chickenpox, Cough, Daytime sleepiness, Dizziness, Frequent urination, Danish measles, Hypertension, Influenza, Painful urination, Prostate cancer (HCC), Shortness of breath, Snoring, Swelling of lower extremity, and Wears glasses.    Surgical History   has a past surgical history that includes vasectomy.     Family History  family history includes Alzheimer's Disease in his mother; Cancer in his brother.   Family history reviewed with patient. There is no family history that is pertinent to the chief complaint.     Social History   reports that he quit smoking about 43 years ago. His smoking use included cigars. He quit after 10.00 years of use. He has never used smokeless tobacco. He reports current alcohol use. He reports current drug use. Drug: Marijuana.    Allergies  No Known Allergies    Medications  Prior to Admission Medications   Prescriptions Last Dose Informant Patient Reported? Taking?   Cholecalciferol (VITAMIN D3) 50 MCG (2000 UT) Tab 5/20/2022 at AM Patient Yes No   Sig: Take 2,000 Units by mouth every day.   Cyanocobalamin (VITAMIN B12 PO) 5/20/2022 at AM Patient Yes No   Sig: Take 1 Tablet by mouth every day.   FOLIC ACID PO 5/20/2022 at AM Patient Yes Yes   Sig: Take 1 Tablet by mouth every day.   HYDROcodone-acetaminophen (NORCO) 5-325 MG Tab per tablet 5/21/2022 at 0900 Patient Yes Yes   Sig: Take 0.5-1 Tablets by mouth every four hours as needed.  Indications: Pain   TURMERIC PO FEW DAYS AGO at [facility] Patient Yes No   Sig: Take 1 Tablet by mouth every day.   allopurinol (ZYLOPRIM) 300 MG Tab 5/20/2022 at AM Patient No No   Sig: Take 1 Tablet by mouth every day.   furosemide (LASIX) 20 MG Tab 5/20/2022 at AM Patient Yes Yes   Sig: Take 20 mg by mouth 1 time a day as needed (Swelling).   losartan (COZAAR) 100 MG Tab 5/20/2022 at AM Patient No No   Sig: Take 1 Tablet by mouth every day.   metoprolol tartrate (LOPRESSOR) 100 MG Tab 5/20/2022 at PM Patient Yes Yes   Sig: Take 100 mg by mouth 2 times a day.   potassium chloride ER (KLOR-CON) 10 MEQ tablet FEW DAYS AGO at [facility] Patient No No   Sig: Take 1 tablet by mouth once daily   Patient taking differently: Take 10 mEq by mouth every day.   rosuvastatin (CRESTOR) 5 MG Tab 5/20/2022 at PM Patient No No   Sig: Take 1 tablet by mouth once daily   Patient taking differently: Take 5 mg by mouth every day.      Facility-Administered Medications: None       Physical Exam  Temp:  [35.6 °C (96.1 °F)-36.7 °C (98.1 °F)] 35.6 °C (96.1 °F)  Pulse:  [] 77  Resp:  [16-28] 20  BP: ()/(51-84) 110/57  SpO2:  [90 %-98 %] 98 %  Blood Pressure : 110/57   Temperature: (!) 35.6 °C (96.1 °F)   Pulse: 77   Respiration: 20   Pulse Oximetry: 98 %       Physical Exam  Vitals and nursing note reviewed.   Constitutional:       General: He is not in acute distress.     Appearance: Normal appearance. He is normal weight. He is not toxic-appearing.   HENT:      Head: Normocephalic and atraumatic.      Nose: Nose normal. No congestion or rhinorrhea.      Mouth/Throat:      Mouth: Mucous membranes are moist.      Pharynx: Oropharynx is clear.   Eyes:      Extraocular Movements: Extraocular movements intact.      Conjunctiva/sclera: Conjunctivae normal.      Pupils: Pupils are equal, round, and reactive to light.   Neck:      Vascular: No carotid bruit.   Cardiovascular:      Rate and Rhythm: Normal rate and regular rhythm.      Pulses:  Normal pulses.      Heart sounds: Normal heart sounds. No murmur heard.    No gallop.   Pulmonary:      Effort: No respiratory distress.      Breath sounds: Normal breath sounds. No wheezing or rales.   Abdominal:      General: Abdomen is flat. Bowel sounds are normal. There is no distension.      Palpations: Abdomen is soft. There is no mass.      Tenderness: There is no abdominal tenderness.      Hernia: No hernia is present.   Musculoskeletal:         General: No tenderness or signs of injury.      Cervical back: Normal range of motion and neck supple. No muscular tenderness.   Lymphadenopathy:      Cervical: No cervical adenopathy.   Skin:     Capillary Refill: Capillary refill takes less than 2 seconds.      Coloration: Skin is not jaundiced or pale.      Findings: No bruising.   Neurological:      General: No focal deficit present.      Mental Status: He is alert and oriented to person, place, and time. Mental status is at baseline.      Cranial Nerves: No cranial nerve deficit.      Motor: No weakness.      Coordination: Coordination normal.   Psychiatric:         Mood and Affect: Mood normal.         Thought Content: Thought content normal.         Judgment: Judgment normal.         Laboratory:  Recent Labs     05/21/22  1455   WBC 4.1*   RBC 3.42*   HEMOGLOBIN 11.0*   HEMATOCRIT 31.4*   MCV 91.8   MCH 32.2   MCHC 35.0   RDW 45.2   PLATELETCT 122*   MPV 9.4     Recent Labs     05/21/22  1455   SODIUM 125*   POTASSIUM 4.1   CHLORIDE 96   CO2 18*   GLUCOSE 150*   BUN 26*   CREATININE 0.74   CALCIUM 8.3*     Recent Labs     05/21/22  1455   ALTSGPT 33   ASTSGOT 90*   ALKPHOSPHAT 332*   TBILIRUBIN 0.8   GLUCOSE 150*         Recent Labs     05/21/22  1455   NTPROBNP 514*         Recent Labs     05/21/22  1455   TROPONINT 22*       Imaging:  CT-HEAD W/O   Final Result         1. No acute intracranial abnormality. No evidence of acute intracranial hemorrhage or mass lesion.                     DX-CHEST-PORTABLE (1  VIEW)   Final Result         1. Low lung volume with hypoventilatory change.      EC-ECHOCARDIOGRAM COMPLETE W/ CONT    (Results Pending)       X-Ray:  I have personally reviewed the images and compared with prior images.    Assessment/Plan:  Justification for Admission Status  I anticipate this patient will require at least two midnights for appropriate medical management, necessitating inpatient admission because A. fib with RVR, hyponatremia, and metabolic acidosis.    * Atrial fibrillation with rapid ventricular response (HCC)  Assessment & Plan  Titratable IV esmolol per cardiology recommendation, Eliquis.  Follow-up telemetry, troponin and echocardiogram.    Hypoalbuminemia  Assessment & Plan  Likely secondary to malnutrition of alcoholism.  Follow-up prealbumin    Alcohol abuse  Assessment & Plan  Supportive care, CIWA protocol    Acute encephalopathy  Assessment & Plan  Likely secondary to alcohol withdrawal.  CIWA protocol    Hyponatremia  Assessment & Plan  Likely secondary to malnutrition, follow-up chemistry    Hypomagnesemia  Assessment & Plan  Magnesium repletion,    LINDY (obstructive sleep apnea)  Assessment & Plan  Trial CPAP    Pulmonary hypertension (HCC)- (present on admission)  Assessment & Plan  Strongly suggested by exam.  Follow-up echocardiogram    Hypertension- (present on admission)  Assessment & Plan  Continue outpatient Lopressor    Hyperlipidemia- (present on admission)  Assessment & Plan  Crestor, follow-up lipid profile      VTE prophylaxis: SCDs/TEDs

## 2022-05-22 NOTE — ASSESSMENT & PLAN NOTE
HypoNa, ETOH withdrawal?  Overall improved from admission though waxes and wanes  Cont to address Na and monitor  Daughter confirms he hasn't drank in several weeks  DC CIWA protocol  Minimize sedating medications

## 2022-05-22 NOTE — ASSESSMENT & PLAN NOTE
History of pulmonary hypertension with RSVP of 60 mmHg in 9/21  Normalization of pulmonary artery pressures by echocardiography in 1/22  Repeat echocardiogram

## 2022-05-22 NOTE — ED NOTES
Report received from prior RN. Pt alert. Resp normal/unlabored. Bed side rails up/in low position. Pt updated to POC and all questions answered.     Pt's daughter at bedside.

## 2022-05-22 NOTE — CARE PLAN
The patient is Watcher - Medium risk of patient condition declining or worsening    Shift Goals  Clinical Goals: Heart rate control; Hemodynamic stability  Patient Goals: Rest  Family Goals: NAHUM    Progress made toward(s) clinical / shift goals:      Patient is not progressing towards the following goals:      Problem: Mobility  Goal: Patient's capacity to carry out activities will improve  Outcome: Not Progressing     Problem: Knowledge Deficit - Standard  Goal: Patient and family/care givers will demonstrate understanding of plan of care, disease process/condition, diagnostic tests and medications  Outcome: Progressing     Problem: Optimal Care for Alcohol Withdrawal  Goal: Optimal Care for the alcohol withdrawal patient  Outcome: Progressing     Problem: Seizure Precautions  Goal: Implementation of seizure precautions  Outcome: Progressing     Problem: Lifestyle Changes  Goal: Patient's ability to identify lifestyle changes and available resources to help reduce recurrence of condition will improve  Outcome: Progressing     Problem: Psychosocial  Goal: Patient's level of anxiety will decrease  Outcome: Progressing  Goal: Spiritual and cultural needs incorporated into hospitalization  Outcome: Progressing     Problem: Risk for Aspiration  Goal: Patient's risk for aspiration will be absent or decrease  Outcome: Progressing     Problem: Skin Integrity  Goal: Skin integrity is maintained or improved  Outcome: Progressing     Problem: Fall Risk  Goal: Patient will remain free from falls  Outcome: Progressing     Problem: Hemodynamics  Goal: Patient's hemodynamics, fluid balance and neurologic status will be stable or improve  Outcome: Progressing     Problem: Nutrition  Goal: Patient's nutritional and fluid intake will be adequate or improve  Outcome: Progressing     Problem: Urinary Elimination  Goal: Establish and maintain regular urinary output  Outcome: Progressing     Problem: Bowel Elimination  Goal: Establish  and maintain regular bowel function  Outcome: Progressing     Problem: Self Care  Goal: Patient will have the ability to perform ADLs independently or with assistance (bathe, groom, dress, toilet and feed)  Outcome: Progressing

## 2022-05-22 NOTE — CONSULTS
Cardiology Consult Note:    Vinay Alston M.D.  Date & Time note created:    5/21/2022   7:13 PM     Referring MD:  Dr. Rivera    Patient ID:   Name:             Wilfredo Kohler   YOB: 1945  Age:                 77 y.o.  male   MRN:               [id number]                                                             Chief Complaint / Reason for consult:  Atrial fibrillation with RVR    History of Present Illness:    This is a 77 years old man without prior history of cardiac problems, presented to the hospital after the family found him not feeling well.  Patient also has been dehydrated not drinking enough water lately.  He does have a prior history of drinking alcohol but has not been drinking for 2 weeks.  In the ER, patient was found to be in atrial fibrillation with rapid ventricular rate.  Cardiology has been consulted for further care.  It is unknown when the patient went into atrial fibrillation.  He does not feel the palpitation.  He only feels fatigue and tired lethargic today.    I personally interpreted the EKG tracing, blood test results.    Patient did have a transthoracic echocardiogram in January 2022 which showed normal LV function.  I personally interpreted the images.    Review of Systems:      Constitutional: Denies fevers, Denies weight changes  Eyes: Denies changes in vision, no eye pain  Ears/Nose/Throat/Mouth: Denies nasal congestion or sore throat   Cardiovascular: no chest pain, no palpitations   Respiratory: no shortness of breath , Denies cough  Gastrointestinal/Hepatic: Denies abdominal pain, nausea, vomiting, diarrhea, constipation or GI bleeding   Genitourinary: Denies dysuria or frequency  Musculoskeletal/Rheum: Denies  joint pain and swelling   Skin: Denies rash  Neurological: Denies headache, confusion, memory loss or focal weakness/parasthesias  Psychiatric: denies mood disorder   Endocrine: Jena thyroid problems  Heme/Oncology/Lymph Nodes: Denies  enlarged lymph nodes, denies brusing or known bleeding disorder  All other systems were reviewed and are negative (AMA/CMS criteria)                Past Medical History:   Past Medical History:   Diagnosis Date   • Abdominal pain    • Chickenpox    • Cough    • Daytime sleepiness    • Dizziness    • Frequent urination    • [place] Islander measles    • Hypertension    • Influenza    • Painful urination    • Prostate cancer (HCC)    • Shortness of breath    • Snoring    • Swelling of lower extremity    • Wears glasses      Active Hospital Problems    Diagnosis    • Atrial fibrillation with rapid ventricular response (HCC) [I48.91]      Priority: High   • Hyponatremia [E87.1]      Priority: Medium   • LINDY (obstructive sleep apnea) [G47.33]      Priority: Low   • Hypomagnesemia [E83.42]      Priority: Low   • Pulmonary hypertension (HCC) [I27.20]    • Hyperlipidemia [E78.5]    • Hypertension [I10]        Past Surgical History:  Past Surgical History:   Procedure Laterality Date   • VASECTOMY         Hospital Medications:    Current Facility-Administered Medications:   •  NS infusion, , Intravenous, Continuous, Vaughn Rivera M.D., Last Rate: 125 mL/hr at 05/21/22 1730, New Bag at 05/21/22 1730  •  Metoprolol Tartrate (LOPRESSOR) injection 5 mg, 5 mg, Intravenous, Once, Vaughn Rivera M.D.  •  esmolol (BREVIBLOC) 2000 mg/100mL premix infusion, 0-200 mcg/kg/min, Intravenous, Continuous, Vaughn Rivera M.D., Last Rate: 22.5 mL/hr at 05/21/22 1749, 75 mcg/kg/min at 05/21/22 1749  •  magnesium sulfate IVPB premix 2 g, 2 g, Intravenous, Once, Edgar Watkins M.D., Last Rate: 25 mL/hr at 05/21/22 1903, 2 g at 05/21/22 1903  •  thiamine (B-1) IVPB 100 mg in 100 mL D5W (premix), 100 mg, Intravenous, DAILY, Edgar Watkins M.D.  •  NS (BOLUS) infusion 1,000 mL, 1,000 mL, Intravenous, Once, Vinay Alston M.D.  •  [START ON 5/22/2022] vitamin D3 (cholecalciferol) tablet 2,000 Units, 2,000 Units, Oral, DAILY, Edgar Watkins,  M.D.  •  HYDROcodone-acetaminophen (NORCO) 5-325 MG per tablet 0.5-1 Tablet, 0.5-1 Tablet, Oral, Q6HRS PRN, Edgar Watkins M.D.  •  [START ON 5/22/2022] losartan (COZAAR) tablet 100 mg, 100 mg, Oral, DAILY, Edgar Watkins M.D.  •  metoprolol tartrate (LOPRESSOR) tablet 100 mg, 100 mg, Oral, BID, Edgar Watkins M.D.  •  [START ON 5/22/2022] rosuvastatin (CRESTOR) tablet 5 mg, 5 mg, Oral, DAILY, Edgar Watkins M.D.  •  senna-docusate (PERICOLACE or SENOKOT S) 8.6-50 MG per tablet 2 Tablet, 2 Tablet, Oral, BID **AND** polyethylene glycol/lytes (MIRALAX) PACKET 1 Packet, 1 Packet, Oral, QDAY PRN **AND** magnesium hydroxide (MILK OF MAGNESIA) suspension 30 mL, 30 mL, Oral, QDAY PRN **AND** bisacodyl (DULCOLAX) suppository 10 mg, 10 mg, Rectal, QDAY PRN, Edgar Watkins M.D.  •  acetaminophen (Tylenol) tablet 650 mg, 650 mg, Oral, Q6HRS PRN, Edgar Watkins M.D.  •  [START ON 5/22/2022] rivaroxaban (XARELTO) tablet 10 mg, 10 mg, Oral, DAILY, Edgar Watkins M.D.  •  Notify provider if pain remains uncontrolled, , , CONTINUOUS **AND** Use the Numeric Rating Scale (NRS), Cox-Baker Faces (WBF), or FLACC on regular floors and Critical-Care Pain Observation Tool (CPOT) on ICUs/Trauma to assess pain, , , CONTINUOUS **AND** Pulse Ox, , , CONTINUOUS **AND** Pharmacy Consult Request ...Pain Management Review 1 Each, 1 Each, Other, PHARMACY TO DOSE **AND** If patient difficult to arouse and/or has respiratory depression (respiratory rate of 10 or less), stop any opiates that are currently infusing and call a Rapid Response., , , CONTINUOUS, Edgar E Roland, M.D.  •  oxyCODONE immediate-release (ROXICODONE) tablet 2.5 mg, 2.5 mg, Oral, Q3HRS PRN **OR** oxyCODONE immediate-release (ROXICODONE) tablet 5 mg, 5 mg, Oral, Q3HRS PRN **OR** HYDROmorphone (Dilaudid) injection 0.25 mg, 0.25 mg, Intravenous, Q3HRS PRN, Edgar Watkins M.D.  •  hydrALAZINE (APRESOLINE) injection 10 mg, 10 mg, Intravenous, Q4HRS PRN, Edgar Watkins M.D.  •   [START ON 5/22/2022] rivaroxaban (XARELTO) tablet 20 mg, 20 mg, Oral, PM MEAL, Edgar Watkins M.D.  •  [START ON 5/22/2022] aspirin EC (ECOTRIN) tablet 81 mg, 81 mg, Oral, DAILY, Edgar Watkins M.D.  •  LORazepam (ATIVAN) tablet 0.5 mg, 0.5 mg, Oral, Q4HRS PRN, Edgar Watkins M.D.  •  LORazepam (ATIVAN) tablet 1 mg, 1 mg, Oral, Q4HRS PRN **OR** LORazepam (ATIVAN) injection 0.5 mg, 0.5 mg, Intravenous, Q4HRS PRN, Edgar Watkins M.D.  •  LORazepam (ATIVAN) tablet 2 mg, 2 mg, Oral, Q2HRS PRN **OR** LORazepam (ATIVAN) injection 1 mg, 1 mg, Intravenous, Q2HRS PRN, Edgar Watkins M.D.  •  LORazepam (ATIVAN) tablet 3 mg, 3 mg, Oral, Q HOUR PRN **OR** LORazepam (ATIVAN) injection 1.5 mg, 1.5 mg, Intravenous, Q HOUR PRN, Edgar Watkins M.D.  •  LORazepam (ATIVAN) tablet 4 mg, 4 mg, Oral, Q15 MIN PRN **OR** LORazepam (ATIVAN) injection 2 mg, 2 mg, Intravenous, Q15 MIN PRN, Edgar Watkins M.D.  •  detox IV 1000 mL (D5LR + magnesium 1 g + thiamine 100 mg + folic acid 1 mg) infusion, , Intravenous, Once **AND** [START ON 5/22/2022] thiamine (Vitamin B-1) tablet 100 mg, 100 mg, Oral, DAILY **AND** [START ON 5/22/2022] multivitamin (THERAGRAN) tablet 1 Tablet, 1 Tablet, Oral, DAILY **AND** [START ON 5/22/2022] folic acid (FOLVITE) tablet 1 mg, 1 mg, Oral, DAILY, Edgar Watkins M.D.    Current Outpatient Medications:   •  FOLIC ACID PO, Take 1 Tablet by mouth every day., Disp: , Rfl:   •  furosemide (LASIX) 20 MG Tab, Take 20 mg by mouth 1 time a day as needed (Swelling)., Disp: , Rfl:   •  metoprolol tartrate (LOPRESSOR) 100 MG Tab, Take 100 mg by mouth 2 times a day., Disp: , Rfl:   •  HYDROcodone-acetaminophen (NORCO) 5-325 MG Tab per tablet, Take 0.5-1 Tablets by mouth every four hours as needed. Indications: Pain, Disp: , Rfl:   •  potassium chloride ER (KLOR-CON) 10 MEQ tablet, Take 1 tablet by mouth once daily (Patient taking differently: Take 10 mEq by mouth every day.), Disp: 90 Tablet, Rfl: 0  •  rosuvastatin  (CRESTOR) 5 MG Tab, Take 1 tablet by mouth once daily (Patient taking differently: Take 5 mg by mouth every day.), Disp: 90 Tablet, Rfl: 0  •  Cyanocobalamin (VITAMIN B12 PO), Take 1 Tablet by mouth every day., Disp: , Rfl:   •  Cholecalciferol (VITAMIN D3) 50 MCG (2000 UT) Tab, Take 2,000 Units by mouth every day., Disp: , Rfl:   •  TURMERIC PO, Take 1 Tablet by mouth every day., Disp: , Rfl:   •  losartan (COZAAR) 100 MG Tab, Take 1 Tablet by mouth every day., Disp: 90 Tablet, Rfl: 3  •  allopurinol (ZYLOPRIM) 300 MG Tab, Take 1 Tablet by mouth every day., Disp: 90 Tablet, Rfl: 3    Current Outpatient Medications:  (Not in a hospital admission)      Medication Allergy:  No Known Allergies    Family History:  Family History   Problem Relation Age of Onset   • Alzheimer's Disease Mother    • Cancer Brother         kidney       Social History:  Social History     Socioeconomic History   • Marital status:      Spouse name: Not on file   • Number of children: Not on file   • Years of education: Not on file   • Highest education level: Not on file   Occupational History   • Not on file   Tobacco Use   • Smoking status: Former Smoker     Years: 10.00     Types: Cigars     Quit date: 1978     Years since quittin.7   • Smokeless tobacco: Never Used   Vaping Use   • Vaping Use: Never used   Substance and Sexual Activity   • Alcohol use: Yes     Comment: bottle of wine a night   • Drug use: Yes     Types: Marijuana     Comment: Gummies 1 every night to sleep   • Sexual activity: Not on file     Comment: ; 2 daughters; 4 grandkids; retired - [employer]; moved to [place] from [employer]   Other Topics Concern   • Not on file   Social History Narrative   • Not on file     Social Determinants of Health     Financial Resource Strain: Not on file   Food Insecurity: Not on file   Transportation Needs: Not on file   Physical Activity: Not on file   Stress: Not on file   Social Connections: Not  "on file   Intimate Partner Violence: Not on file   Housing Stability: Not on file         Physical Exam:  Vitals/ General Appearance:   Weight/BMI: Body mass index is 35.51 kg/m².  /66   Pulse (!) 156   Temp 36.7 °C (98.1 °F) (Temporal)   Resp 20   Ht 1.676 m (5' 6\")   Wt 99.8 kg (220 lb)   SpO2 92%   Vitals:    22 1500 22 1600 22 1638 22 1700   BP: 130/59 133/67 129/60 121/66   Pulse: (!) 150 (!) 156 (!) 152 (!) 156   Resp:  16 20   Temp:       TempSrc:       SpO2: 97% 90% 90% 92%   Weight:       Height:         Oxygen Therapy:  Pulse Oximetry: 92 %, O2 (LPM): 2    Constitutional:   No acute distress  HENMT:  Normocephalic, Atraumatic.  Eyes:  EOMI, No discharge.  Neck:  no JVD.  Cardiovascular:  Normal heart rate, Normal rhythm.  Extremitites with intact distal pulses, no cyanosis, or edema.  Lungs:  No respiratory distress.  Abdomen: Soft, No tenderness, No guarding, No rebound.  Skin: No significant rash.  Neurologic: Alert & oriented x 3, No focal deficits noted, cranial nerves II through X are intact.  Psychiatric: Affect normal, Judgment normal, Mood normal.      MDM (Data Review):     Records reviewed and summarized in current documentation    Lab Data Review:  Recent Results (from the past 24 hour(s))   EKG (NOW)    Collection Time: 22  2:41 PM   Result Value Ref Range    Report       [facility]    Test Date:  2022  Pt Name:    DAVID DUFF               Department: ER  MRN:        [id number]                      Room:        08  Gender:     Male                         Technician: [id number]  :        1945                   Requested By:ER TRIAGE PROTOCOL  Order #:    [id number]                    Reading MD: BRET PERES MD    Measurements  Intervals                                Axis  Rate:       176                          P:  WV:                                      QRS:        138  QRSD:       103      "                     T:          33  QT:         291  QTc:        498    Interpretive Statements  Atrial fibrillation with rapid V-rate 176  Ventricular premature complex  Right axis deviation  Low voltage, extremity leads  Poor R wave progression anteriorly  Compared to ECG 01/11/2022 15:21:10  Ventricular premature complex(es) now present  Low QRS voltage now present  Nonspecific changes  A. fib instead of s inus bradycardia  My impression of this EKG, A. fib with rapid ventricular response, does not  meet  STEMI criteria at this time  Electronically Signed On 5- 15:15:18 PDT by BRET PERES MD     CBC w/ Differential    Collection Time: 05/21/22  2:55 PM   Result Value Ref Range    WBC 4.1 (L) 4.8 - 10.8 K/uL    RBC 3.42 (L) 4.70 - 6.10 M/uL    Hemoglobin 11.0 (L) 14.0 - 18.0 g/dL    Hematocrit 31.4 (L) 42.0 - 52.0 %    MCV 91.8 81.4 - 97.8 fL    MCH 32.2 27.0 - 33.0 pg    MCHC 35.0 33.7 - 35.3 g/dL    RDW 45.2 35.9 - 50.0 fL    Platelet Count 122 (L) 164 - 446 K/uL    MPV 9.4 9.0 - 12.9 fL    Neutrophils-Polys 64.60 44.00 - 72.00 %    Lymphocytes 23.70 22.00 - 41.00 %    Monocytes 3.60 0.00 - 13.40 %    Eosinophils 1.80 0.00 - 6.90 %    Basophils 0.00 0.00 - 1.80 %    Nucleated RBC 2.20 /100 WBC    Neutrophils (Absolute) 2.72 1.82 - 7.42 K/uL    Lymphs (Absolute) 0.97 (L) 1.00 - 4.80 K/uL    Monos (Absolute) 0.15 0.00 - 0.85 K/uL    Eos (Absolute) 0.07 0.00 - 0.51 K/uL    Baso (Absolute) 0.00 0.00 - 0.12 K/uL    NRBC (Absolute) 0.09 K/uL   Complete Metabolic Panel (CMP)    Collection Time: 05/21/22  2:55 PM   Result Value Ref Range    Sodium 125 (L) 135 - 145 mmol/L    Potassium 4.1 3.6 - 5.5 mmol/L    Chloride 96 96 - 112 mmol/L    Co2 18 (L) 20 - 33 mmol/L    Anion Gap 11.0 7.0 - 16.0    Glucose 150 (H) 65 - 99 mg/dL    Bun 26 (H) 8 - 22 mg/dL    Creatinine 0.74 0.50 - 1.40 mg/dL    Calcium 8.3 (L) 8.5 - 10.5 mg/dL    AST(SGOT) 90 (H) 12 - 45 U/L    ALT(SGPT) 33 2 - 50 U/L    Alkaline Phosphatase  332 (H) 30 - 99 U/L    Total Bilirubin 0.8 0.1 - 1.5 mg/dL    Albumin 2.6 (L) 3.2 - 4.9 g/dL    Total Protein 5.4 (L) 6.0 - 8.2 g/dL    Globulin 2.8 1.9 - 3.5 g/dL    A-G Ratio 0.9 g/dL   proBrain Natriuretic Peptide, NT    Collection Time: 05/21/22  2:55 PM   Result Value Ref Range    NT-proBNP 514 (H) 0 - 125 pg/mL   Troponin STAT    Collection Time: 05/21/22  2:55 PM   Result Value Ref Range    Troponin T 22 (H) 6 - 19 ng/L   Magnesium    Collection Time: 05/21/22  2:55 PM   Result Value Ref Range    Magnesium 1.4 (L) 1.5 - 2.5 mg/dL   Phosphorus    Collection Time: 05/21/22  2:55 PM   Result Value Ref Range    Phosphorus 4.3 2.5 - 4.5 mg/dL   TSH (for screening thyroid dysfunction)    Collection Time: 05/21/22  2:55 PM   Result Value Ref Range    TSH 1.220 0.380 - 5.330 uIU/mL   ESTIMATED GFR    Collection Time: 05/21/22  2:55 PM   Result Value Ref Range    GFR (CKD-EPI) 93 >60 mL/min/1.73 m 2   DIFFERENTIAL MANUAL    Collection Time: 05/21/22  2:55 PM   Result Value Ref Range    Bands-Stabs 1.80 0.00 - 10.00 %    Metamyelocytes 1.80 %    Myelocytes 2.70 %    Manual Diff Status PERFORMED    PERIPHERAL SMEAR REVIEW    Collection Time: 05/21/22  2:55 PM   Result Value Ref Range    Peripheral Smear Review see below    PLATELET ESTIMATE    Collection Time: 05/21/22  2:55 PM   Result Value Ref Range    Plt Estimation #CAC    MORPHOLOGY    Collection Time: 05/21/22  2:55 PM   Result Value Ref Range    RBC Morphology #CRI    URINALYSIS    Collection Time: 05/21/22  4:00 PM    Specimen: Urine   Result Value Ref Range    Color Yellow     Character Cloudy (A)     Specific Gravity 1.019 <1.035    Ph 5.0 5.0 - 8.0    Glucose Negative Negative mg/dL    Ketones Negative Negative mg/dL    Protein Negative Negative mg/dL    Bilirubin Negative Negative    Urobilinogen, Urine 1.0 Negative    Nitrite Negative Negative    Leukocyte Esterase Negative Negative    Occult Blood Negative Negative    Micro Urine Req Microscopic    URINE  MICROSCOPIC (W/UA)    Collection Time: 05/21/22  4:00 PM   Result Value Ref Range    WBC 5-10 (A) /hpf    RBC 2-5 (A) /hpf    Bacteria Negative None /hpf    Epithelial Cells Few /hpf    Epithelial Cells Renal Few /hpf    Hyaline Cast 6-10 (A) /lpf       Imaging/Procedures Review:    Chest Xray:  Reviewed    EKG:   As in HPI.     MDM (Assessment and Plan):     Active Hospital Problems    Diagnosis    • Atrial fibrillation with rapid ventricular response (HCC) [I48.91]      Priority: High   • Hyponatremia [E87.1]      Priority: Medium   • LINDY (obstructive sleep apnea) [G47.33]      Priority: Low   • Hypomagnesemia [E83.42]      Priority: Low   • Pulmonary hypertension (HCC) [I27.20]    • Hyperlipidemia [E78.5]    • Hypertension [I10]          At this time, patient might be dehydrated and dehydration might be the enticing reason for his atrial fibrillation with RVR.  Optimize IV fluid at this time.  Okay to start Eliquis 5 mg p.o. twice a day.  Okay to start esmolol drip.  I suspect that patient will revert back into sinus rhythm overnight.  If not, consider CONCHITA and cardioversion thereafter.      Thank you for referring this patient to our cardiology service.  We will follow patient with you.      Vinay Alston MD.   Cardiology Inpatient Service.  [facility].  [phone].  Gay Lowe.

## 2022-05-22 NOTE — ASSESSMENT & PLAN NOTE
Goal SBP less than 160  Continue losartan, 100 mg daily  Continue metoprolol tartrate, 100 mg twice daily

## 2022-05-23 ENCOUNTER — HOSPITAL ENCOUNTER (OUTPATIENT)
Dept: RADIOLOGY | Facility: MEDICAL CENTER | Age: 77
End: 2022-05-23
Attending: INTERNAL MEDICINE
Payer: MEDICARE

## 2022-05-23 ENCOUNTER — APPOINTMENT (OUTPATIENT)
Dept: CARDIOLOGY | Facility: MEDICAL CENTER | Age: 77
DRG: 308 | End: 2022-05-23
Attending: INTERNAL MEDICINE
Payer: MEDICARE

## 2022-05-23 ENCOUNTER — PATIENT OUTREACH (OUTPATIENT)
Dept: OTHER | Facility: MEDICAL CENTER | Age: 77
End: 2022-05-23
Payer: MEDICARE

## 2022-05-23 DIAGNOSIS — Z45.2 FITTING AND ADJUSTMENT OF VASCULAR CATHETER: ICD-10-CM

## 2022-05-23 DIAGNOSIS — T45.1X5S NEUTROPENIA ASSOCIATED WITH MUCOSITIS DUE TO ANTINEOPLASTIC THERAPY (HCC): ICD-10-CM

## 2022-05-23 DIAGNOSIS — C61 MALIGNANT NEOPLASM OF PROSTATE (HCC): ICD-10-CM

## 2022-05-23 DIAGNOSIS — D63.8 MEGALOBLASTIC ANEMIA DUE TO FISH TAPEWORM: ICD-10-CM

## 2022-05-23 DIAGNOSIS — Z51.11 ENCOUNTER FOR ANTINEOPLASTIC CHEMOTHERAPY: ICD-10-CM

## 2022-05-23 DIAGNOSIS — C79.52 SECONDARY MALIGNANT NEOPLASM OF BONE AND BONE MARROW (HCC): ICD-10-CM

## 2022-05-23 DIAGNOSIS — D70.1 NEUTROPENIA ASSOCIATED WITH MUCOSITIS DUE TO ANTINEOPLASTIC THERAPY (HCC): ICD-10-CM

## 2022-05-23 DIAGNOSIS — C79.51 SECONDARY MALIGNANT NEOPLASM OF BONE AND BONE MARROW (HCC): ICD-10-CM

## 2022-05-23 DIAGNOSIS — K59.00 CONSTIPATION, UNSPECIFIED CONSTIPATION TYPE: ICD-10-CM

## 2022-05-23 DIAGNOSIS — B70.0 MEGALOBLASTIC ANEMIA DUE TO FISH TAPEWORM: ICD-10-CM

## 2022-05-23 DIAGNOSIS — Z79.899 NEED FOR PROPHYLACTIC CHEMOTHERAPY: ICD-10-CM

## 2022-05-23 DIAGNOSIS — K12.31 NEUTROPENIA ASSOCIATED WITH MUCOSITIS DUE TO ANTINEOPLASTIC THERAPY (HCC): ICD-10-CM

## 2022-05-23 LAB
ANION GAP SERPL CALC-SCNC: 12 MMOL/L (ref 7–16)
BUN SERPL-MCNC: 20 MG/DL (ref 8–22)
CALCIUM SERPL-MCNC: 7.9 MG/DL (ref 8.5–10.5)
CHLORIDE SERPL-SCNC: 98 MMOL/L (ref 96–112)
CO2 SERPL-SCNC: 18 MMOL/L (ref 20–33)
CREAT SERPL-MCNC: 0.66 MG/DL (ref 0.5–1.4)
GFR SERPLBLD CREATININE-BSD FMLA CKD-EPI: 96 ML/MIN/1.73 M 2
GLUCOSE SERPL-MCNC: 115 MG/DL (ref 65–99)
LV EJECT FRACT  99904: 65
LV EJECT FRACT MOD 2C 99903: 74.52
LV EJECT FRACT MOD 4C 99902: 62.82
LV EJECT FRACT MOD BP 99901: 68.02
MAGNESIUM SERPL-MCNC: 1.6 MG/DL (ref 1.5–2.5)
PHOSPHATE SERPL-MCNC: 3.9 MG/DL (ref 2.5–4.5)
POTASSIUM SERPL-SCNC: 4 MMOL/L (ref 3.6–5.5)
SODIUM SERPL-SCNC: 128 MMOL/L (ref 135–145)

## 2022-05-23 PROCEDURE — 700111 HCHG RX REV CODE 636 W/ 250 OVERRIDE (IP): Performed by: INTERNAL MEDICINE

## 2022-05-23 PROCEDURE — 700102 HCHG RX REV CODE 250 W/ 637 OVERRIDE(OP): Performed by: HOSPITALIST

## 2022-05-23 PROCEDURE — A9270 NON-COVERED ITEM OR SERVICE: HCPCS | Performed by: HOSPITALIST

## 2022-05-23 PROCEDURE — 770020 HCHG ROOM/CARE - TELE (206)

## 2022-05-23 PROCEDURE — 700102 HCHG RX REV CODE 250 W/ 637 OVERRIDE(OP): Performed by: INTERNAL MEDICINE

## 2022-05-23 PROCEDURE — 99232 SBSQ HOSP IP/OBS MODERATE 35: CPT | Performed by: HOSPITALIST

## 2022-05-23 PROCEDURE — 84100 ASSAY OF PHOSPHORUS: CPT

## 2022-05-23 PROCEDURE — 93306 TTE W/DOPPLER COMPLETE: CPT | Mod: 26 | Performed by: INTERNAL MEDICINE

## 2022-05-23 PROCEDURE — 93306 TTE W/DOPPLER COMPLETE: CPT

## 2022-05-23 PROCEDURE — 700111 HCHG RX REV CODE 636 W/ 250 OVERRIDE (IP): Performed by: HOSPITALIST

## 2022-05-23 PROCEDURE — A9270 NON-COVERED ITEM OR SERVICE: HCPCS | Performed by: INTERNAL MEDICINE

## 2022-05-23 PROCEDURE — 700105 HCHG RX REV CODE 258: Performed by: INTERNAL MEDICINE

## 2022-05-23 PROCEDURE — 83735 ASSAY OF MAGNESIUM: CPT

## 2022-05-23 PROCEDURE — 80048 BASIC METABOLIC PNL TOTAL CA: CPT

## 2022-05-23 RX ORDER — MAGNESIUM SULFATE HEPTAHYDRATE 40 MG/ML
4 INJECTION, SOLUTION INTRAVENOUS ONCE
Status: COMPLETED | OUTPATIENT
Start: 2022-05-23 | End: 2022-05-23

## 2022-05-23 RX ADMIN — AMIODARONE HYDROCHLORIDE 400 MG: 200 TABLET ORAL at 17:27

## 2022-05-23 RX ADMIN — LOSARTAN POTASSIUM 100 MG: 50 TABLET, FILM COATED ORAL at 05:19

## 2022-05-23 RX ADMIN — THERA TABS 1 TABLET: TAB at 05:20

## 2022-05-23 RX ADMIN — APIXABAN 5 MG: 5 TABLET, FILM COATED ORAL at 17:27

## 2022-05-23 RX ADMIN — FOLIC ACID 1 MG: 1 TABLET ORAL at 05:19

## 2022-05-23 RX ADMIN — MAGNESIUM SULFATE HEPTAHYDRATE 4 G: 40 INJECTION, SOLUTION INTRAVENOUS at 10:41

## 2022-05-23 RX ADMIN — THIAMINE HYDROCHLORIDE 500 MG: 100 INJECTION, SOLUTION INTRAMUSCULAR; INTRAVENOUS at 06:00

## 2022-05-23 RX ADMIN — APIXABAN 5 MG: 5 TABLET, FILM COATED ORAL at 05:19

## 2022-05-23 RX ADMIN — METOPROLOL TARTRATE 100 MG: 50 TABLET, FILM COATED ORAL at 17:27

## 2022-05-23 RX ADMIN — AMIODARONE HYDROCHLORIDE 400 MG: 200 TABLET ORAL at 05:19

## 2022-05-23 RX ADMIN — ROSUVASTATIN CALCIUM 5 MG: 10 TABLET, FILM COATED ORAL at 17:27

## 2022-05-23 RX ADMIN — HYDRALAZINE HYDROCHLORIDE 10 MG: 20 INJECTION INTRAMUSCULAR; INTRAVENOUS at 02:09

## 2022-05-23 RX ADMIN — Medication 2000 UNITS: at 05:20

## 2022-05-23 RX ADMIN — METOPROLOL TARTRATE 100 MG: 50 TABLET, FILM COATED ORAL at 05:18

## 2022-05-23 RX ADMIN — Medication 400 MG: at 17:26

## 2022-05-23 ASSESSMENT — ENCOUNTER SYMPTOMS
PALPITATIONS: 0
WEAKNESS: 1
VOMITING: 0
DIARRHEA: 0
DOUBLE VISION: 0
LOSS OF CONSCIOUSNESS: 0
NAUSEA: 0
COUGH: 0
HEADACHES: 0
ABDOMINAL PAIN: 0
BACK PAIN: 0
SHORTNESS OF BREATH: 0
FEVER: 0
BLURRED VISION: 0
CHILLS: 0
DIZZINESS: 0

## 2022-05-23 ASSESSMENT — FIBROSIS 4 INDEX: FIB4 SCORE: 11.98

## 2022-05-23 ASSESSMENT — PAIN SCALES - PAIN ASSESSMENT IN ADVANCED DEMENTIA (PAINAD)
TOTALSCORE: 0
CONSOLABILITY: NO NEED TO CONSOLE
BREATHING: NORMAL
FACIALEXPRESSION: SMILING OR INEXPRESSIVE
BODYLANGUAGE: RELAXED

## 2022-05-23 ASSESSMENT — LIFESTYLE VARIABLES
AGITATION: NORMAL ACTIVITY
TREMOR: NO TREMOR
HEADACHE, FULLNESS IN HEAD: NOT PRESENT
TOTAL SCORE: 1
VISUAL DISTURBANCES: NOT PRESENT
PAROXYSMAL SWEATS: NO SWEAT VISIBLE
NAUSEA AND VOMITING: NO NAUSEA AND NO VOMITING
ANXIETY: NO ANXIETY (AT EASE)
ORIENTATION AND CLOUDING OF SENSORIUM: CANNOT DO SERIAL ADDITIONS OR IS UNCERTAIN ABOUT DATE
AUDITORY DISTURBANCES: NOT PRESENT

## 2022-05-23 ASSESSMENT — PATIENT HEALTH QUESTIONNAIRE - PHQ9
SUM OF ALL RESPONSES TO PHQ9 QUESTIONS 1 AND 2: 0
2. FEELING DOWN, DEPRESSED, IRRITABLE, OR HOPELESS: NOT AT ALL
1. LITTLE INTEREST OR PLEASURE IN DOING THINGS: NOT AT ALL

## 2022-05-23 NOTE — PROGRESS NOTES
EVENT NOTE:    Notified MD of rhythm change SR-Afib that took place on prior shift. Patient asymptomatic; verified with monitor technician that rhythm is SR I/O a-fib with occasional PACs. No interventions at this time and will continue to provide updates if patient becomes symptomatic or rhythm changes.

## 2022-05-23 NOTE — PROGRESS NOTES
Received VM from Case Management team requesting call to daughter for navigation services.    Phoned daughter, Cassandra.  Cassandra states pt is treated at [facility].  He is currently admitted.  Daughter states he was scheduled for scans today and is requesting assistance with coordination of care.  Explained daughter would need to contact his providers office to reschedule.  Daughter inquires as to whether scans can be done during this admission.  Directed her to inpatient team.

## 2022-05-23 NOTE — CARE PLAN
Problem: Optimal Care for Alcohol Withdrawal  Goal: Optimal Care for the alcohol withdrawal patient  Outcome: Progressing     Problem: Seizure Precautions  Goal: Implementation of seizure precautions  Outcome: Progressing     Problem: Risk for Aspiration  Goal: Patient's risk for aspiration will be absent or decrease  Outcome: Progressing   The patient is Watcher - Medium risk of patient condition declining or worsening    Shift Goals  Clinical Goals: safety, pain control, mobility, monitor heart rhythm  Patient Goals: go home,  Family Goals: NA    Progress made toward(s) clinical / shift goals:  safety precautions in place. Encourage mobility, tele monitor in place, up in chair for meals     Patient is not progressing towards the following goals:

## 2022-05-23 NOTE — CARE PLAN
Care Plan     Problem: Knowledge Deficit - Standard  Goal: Patient and family/care givers will demonstrate understanding of plan of care, disease process/condition, diagnostic tests and medications  5/23/2022 0255 by Joselito Lopez R.N.  Outcome: Progressing  5/23/2022 0250 by Joselito Lopez R.N.  Outcome: Progressing     Problem: Optimal Care for Alcohol Withdrawal  Goal: Optimal Care for the alcohol withdrawal patient  Outcome: Progressing     Problem: Seizure Precautions  Goal: Implementation of seizure precautions  Outcome: Progressing     Problem: Skin Integrity  Goal: Skin integrity is maintained or improved  Outcome: Progressing     Problem: Fall Risk  Goal: Patient will remain free from falls  Outcome: Progressing   The patient is Watcher - Medium risk of patient condition declining or worsening    Shift Goals  Clinical Goals: Monitor HR, Rhythm, increase mentation, safety  Patient Goals: go home  Family Goals: updates and education      Problem: Safety - Medical Restraint  Goal: Remains free of injury from restraints (Restraint for Interference with Medical Device)  Flowsheets (Taken 5/23/2022 0255)  Addressed this shift: Remains free of injury from restraints (restraint for interference with medical device):   Determine that other, less restrictive measures have been tried or would not be effective before applying the restraint   Evaluate the patient's condition at the time of restraint application   Inform patient/family regarding the reason for restraint   Every 2 hours: Monitor safety, psychosocial status, comfort, nutrition and hydration  Goal: Free from restraint(s) (Restraint for Interference with Medical Device)  Flowsheets (Taken 5/23/2022 0255)  Addressed this shift: Free from restraint(s) (restraint for interference with medical device):   ONCE/SHIFT or MINIMUM Every 12 hours: Assess and document the continuing need for restraints   Every 24 hours: Continued use of restraint requires  Licensed Independent Practitioner to perform face to face examination and written order   Identify and implement measures to help patient regain control

## 2022-05-23 NOTE — PROGRESS NOTES
Bedside SBAR report received, patient sleeping at this time. Bilateral soft wrist restraints in place. No s/sx of acute distress. Bed in lowest locked position and bed alarm in place at this time.

## 2022-05-23 NOTE — PROGRESS NOTES
"[facility] Medicine Daily Progress Note    Date of Service  5/23/2022    Chief Complaint  Wilfredo Kohler Jr. is a 77 y.o. male admitted 5/21/2022 with altered    Hospital Course    76yo PMHx Stg IV prostate CA, HTN, HLD, ETOH, LINDY.  In ED found to be in AFib with RVR, Na 125, Mg 1.4.  Admitted to the ICU on esmolol and amiodarone    Interval Problem Update  Pt feels \"a little foggy\" .   He reports some confusion this am.  Family at bedside agree.  He has no physical complaints however    DW treatment plan with pt and his daughters who are at bedside    Brief episodes of rate controled AFib yesterday, sinus overnight and this am  AFebrile  -130s  4 LNC    I have personally seen and examined the patient at bedside. I discussed the plan of care with patient, family, bedside RN, pharmacy and cardiology.    Consultants/Specialty  cardiology    Code Status  Full Code    Disposition  Patient is not medically cleared for discharge.   Anticipate discharge to to home with close outpatient follow-up.  I have placed the appropriate orders for post-discharge needs.    Review of Systems  Review of Systems   Unable to perform ROS: Mental status change   Constitutional: Negative for chills and fever.   Eyes: Negative for blurred vision and double vision.   Respiratory: Negative for cough and shortness of breath.    Cardiovascular: Negative for chest pain, palpitations and leg swelling.   Gastrointestinal: Negative for abdominal pain, diarrhea, nausea and vomiting.   Genitourinary: Negative for dysuria and urgency.   Musculoskeletal: Negative for back pain.   Neurological: Positive for weakness. Negative for dizziness, loss of consciousness and headaches.        Physical Exam  Temp:  [35.7 °C (96.3 °F)-37.1 °C (98.8 °F)] 37.1 °C (98.8 °F)  Pulse:  [61-90] 83  Resp:  [17-37] 21  BP: (110-163)/() 132/63  SpO2:  [89 %-99 %] 96 %    Physical Exam  Vitals reviewed.   Constitutional:       General: He is not in acute " distress.     Appearance: He is well-developed. He is obese. He is not diaphoretic.   HENT:      Head: Normocephalic and atraumatic.   Eyes:      Conjunctiva/sclera: Conjunctivae normal.   Neck:      Vascular: No JVD.   Cardiovascular:      Rate and Rhythm: Normal rate.      Heart sounds: No murmur heard.    No gallop.   Pulmonary:      Effort: Pulmonary effort is normal. No respiratory distress.      Breath sounds: No stridor. No wheezing or rales.   Abdominal:      Palpations: Abdomen is soft.      Tenderness: There is no abdominal tenderness. There is no guarding or rebound.   Musculoskeletal:         General: No tenderness.      Right lower leg: No edema.      Left lower leg: No edema.   Skin:     General: Skin is warm and dry.      Findings: No rash.   Neurological:      General: No focal deficit present.      Mental Status: He is alert and oriented to person, place, and time.      Comments: Pt          Fluids    Intake/Output Summary (Last 24 hours) at 5/23/2022 0729  Last data filed at 5/23/2022 0700  Gross per 24 hour   Intake 581 ml   Output 1150 ml   Net -569 ml       Laboratory  Recent Labs     05/21/22  1455 05/22/22  0525   WBC 4.1* 2.7*   RBC 3.42* 3.02*   HEMOGLOBIN 11.0* 9.6*   HEMATOCRIT 31.4* 28.3*   MCV 91.8 93.7   MCH 32.2 31.8   MCHC 35.0 33.9   RDW 45.2 45.7   PLATELETCT 122* 102*   MPV 9.4 9.6     Recent Labs     05/21/22  1455 05/22/22  0525 05/23/22  0145   SODIUM 125* 124* 128*   POTASSIUM 4.1 4.0 4.0   CHLORIDE 96 96 98   CO2 18* 21 18*   GLUCOSE 150* 99 115*   BUN 26* 22 20   CREATININE 0.74 0.56 0.66   CALCIUM 8.3* 7.8* 7.9*                   Imaging  CT-HEAD W/O   Final Result         1. No acute intracranial abnormality. No evidence of acute intracranial hemorrhage or mass lesion.                     DX-CHEST-PORTABLE (1 VIEW)   Final Result         1. Low lung volume with hypoventilatory change.      EC-ECHOCARDIOGRAM COMPLETE W/ CONT    (Results Pending)        Assessment/Plan  *  Atrial fibrillation with rapid ventricular response (HCC)  Assessment & Plan  Now back in sinus  Cont po amiodarone load and metoprolol 100  Started apixaban    Hypoalbuminemia  Assessment & Plan  Likely secondary to malnutrition of alcoholism.  Follow-up prealbumin    Alcohol abuse  Assessment & Plan  Supportive care, [facility] protocol  Po Mg, folate, MVI  IV thiamine load    Acute encephalopathy  Assessment & Plan  HypoNa, ETOH withdrawal?  Overall improved from admission though waxes and wanes  Cont to address Na and monitor  Daughter confirms he hasn't drank in several weeks  DC CIWA protocol  Minimize sedating medications    Hyponatremia  Assessment & Plan  SG on UA on admission 1.019  Clinically euvolemic  ?SIADH vs malnourished  Send USom and eBck  Follow closely  conts to correct without active intervention    Hypomagnesemia  Assessment & Plan  Follow and replace IV  Start po supplementation    LINDY (obstructive sleep apnea)  Assessment & Plan  Trial CPAP  Has follow up with outpt Pulm already scheduled    Pulmonary hypertension (HCC)- (present on admission)  Assessment & Plan  Strongly suggested by exam.  Follow-up echocardiogram    Hypertension- (present on admission)  Assessment & Plan  On metoprolol 100 and losartan 100 as outpt  Cont and monitor    Hyperlipidemia- (present on admission)  Assessment & Plan  Crestor, follow-up lipid profile       VTE prophylaxis: therapeutic anticoagulation with apixaban    I have performed a physical exam and reviewed and updated ROS and Plan today (5/23/2022). In review of yesterday's note (5/22/2022), there are no changes except as documented above.

## 2022-05-23 NOTE — DISCHARGE PLANNING
Case Management Discharge Planning    Admission Date: 5/21/2022  GMLOS: 2.4  ALOS: 2    6-Clicks ADL Score:    6-Clicks Mobility Score:        Anticipated Discharge Dispo:      DME Needed: No    Action(s) Taken: Family Conference    Escalations Completed: None    Medically Clear: No    Next Steps: Lsw attended rounds in AM.  PT and OT are gong to assess pt for HH vs SNF.    Lsw met w/ pt, and two dtrs at bedside in afternoon. They requested a referral to [facility]. Lsw completed this and provided dtrCassandra's contact information.    Lsw explained protocol for d/c planning and answered questions regarding level of care nearer to d/c and how that is assessed. Lsw further described Re[facility] vs SNF vs home w/ HH and therapy if needed. Pt states he is walking around now.    Barriers to Discharge: Medical clearance    Is the patient up for discharge tomorrow: No

## 2022-05-24 VITALS
OXYGEN SATURATION: 91 % | SYSTOLIC BLOOD PRESSURE: 128 MMHG | RESPIRATION RATE: 18 BRPM | DIASTOLIC BLOOD PRESSURE: 71 MMHG | HEART RATE: 74 BPM | HEIGHT: 66 IN | BODY MASS INDEX: 36.25 KG/M2 | WEIGHT: 225.53 LBS | TEMPERATURE: 98.8 F

## 2022-05-24 PROCEDURE — 97161 PT EVAL LOW COMPLEX 20 MIN: CPT

## 2022-05-24 PROCEDURE — A9270 NON-COVERED ITEM OR SERVICE: HCPCS | Performed by: INTERNAL MEDICINE

## 2022-05-24 PROCEDURE — 99239 HOSP IP/OBS DSCHRG MGMT >30: CPT | Performed by: INTERNAL MEDICINE

## 2022-05-24 PROCEDURE — 97165 OT EVAL LOW COMPLEX 30 MIN: CPT

## 2022-05-24 PROCEDURE — 700111 HCHG RX REV CODE 636 W/ 250 OVERRIDE (IP): Performed by: STUDENT IN AN ORGANIZED HEALTH CARE EDUCATION/TRAINING PROGRAM

## 2022-05-24 PROCEDURE — A9270 NON-COVERED ITEM OR SERVICE: HCPCS | Performed by: HOSPITALIST

## 2022-05-24 PROCEDURE — 700102 HCHG RX REV CODE 250 W/ 637 OVERRIDE(OP): Performed by: HOSPITALIST

## 2022-05-24 PROCEDURE — 700102 HCHG RX REV CODE 250 W/ 637 OVERRIDE(OP): Performed by: INTERNAL MEDICINE

## 2022-05-24 RX ORDER — LANOLIN ALCOHOL/MO/W.PET/CERES
400 CREAM (GRAM) TOPICAL DAILY
Qty: 30 TABLET | Refills: 0 | Status: SHIPPED | OUTPATIENT
Start: 2022-05-24 | End: 2022-05-24 | Stop reason: SDUPTHER

## 2022-05-24 RX ORDER — LOSARTAN POTASSIUM 100 MG/1
100 TABLET ORAL DAILY
Qty: 90 TABLET | Refills: 3 | Status: SHIPPED | OUTPATIENT
Start: 2022-05-24

## 2022-05-24 RX ORDER — METOPROLOL TARTRATE 100 MG/1
100 TABLET ORAL 2 TIMES DAILY
Qty: 60 TABLET | Refills: 0 | Status: SHIPPED | OUTPATIENT
Start: 2022-05-24 | End: 2022-06-02 | Stop reason: SDUPTHER

## 2022-05-24 RX ORDER — HEPARIN SODIUM (PORCINE) LOCK FLUSH IV SOLN 100 UNIT/ML 100 UNIT/ML
300-500 SOLUTION INTRAVENOUS PRN
Status: DISCONTINUED | OUTPATIENT
Start: 2022-05-24 | End: 2022-05-24 | Stop reason: HOSPADM

## 2022-05-24 RX ORDER — AMIODARONE HYDROCHLORIDE 200 MG/1
TABLET ORAL
Qty: 60 TABLET | Refills: 0 | Status: SHIPPED | OUTPATIENT
Start: 2022-05-24

## 2022-05-24 RX ORDER — LANOLIN ALCOHOL/MO/W.PET/CERES
400 CREAM (GRAM) TOPICAL DAILY
Qty: 30 TABLET | Refills: 0 | Status: SHIPPED | OUTPATIENT
Start: 2022-05-24

## 2022-05-24 RX ORDER — AMIODARONE HYDROCHLORIDE 400 MG/1
TABLET ORAL
Qty: 30 TABLET | Refills: 0 | Status: SHIPPED | OUTPATIENT
Start: 2022-05-24 | End: 2022-05-24 | Stop reason: SDUPTHER

## 2022-05-24 RX ORDER — CHOLECALCIFEROL (VITAMIN D3) 125 MCG
2000 CAPSULE ORAL DAILY
Qty: 30 TABLET | Refills: 0 | Status: SHIPPED | OUTPATIENT
Start: 2022-05-24

## 2022-05-24 RX ORDER — ROSUVASTATIN CALCIUM 5 MG/1
5 TABLET, COATED ORAL DAILY
Qty: 30 TABLET | Refills: 0 | Status: SHIPPED | OUTPATIENT
Start: 2022-05-24

## 2022-05-24 RX ADMIN — LOSARTAN POTASSIUM 100 MG: 50 TABLET, FILM COATED ORAL at 04:37

## 2022-05-24 RX ADMIN — Medication 2000 UNITS: at 04:37

## 2022-05-24 RX ADMIN — AMIODARONE HYDROCHLORIDE 400 MG: 200 TABLET ORAL at 04:37

## 2022-05-24 RX ADMIN — APIXABAN 5 MG: 5 TABLET, FILM COATED ORAL at 04:37

## 2022-05-24 RX ADMIN — OXYCODONE 5 MG: 5 TABLET ORAL at 02:32

## 2022-05-24 RX ADMIN — THERA TABS 1 TABLET: TAB at 04:37

## 2022-05-24 RX ADMIN — FOLIC ACID 1 MG: 1 TABLET ORAL at 04:39

## 2022-05-24 RX ADMIN — HEPARIN 500 UNITS: 100 SYRINGE at 15:32

## 2022-05-24 RX ADMIN — METOPROLOL TARTRATE 100 MG: 50 TABLET, FILM COATED ORAL at 04:37

## 2022-05-24 ASSESSMENT — COGNITIVE AND FUNCTIONAL STATUS - GENERAL
WALKING IN HOSPITAL ROOM: A LITTLE
SUGGESTED CMS G CODE MODIFIER DAILY ACTIVITY: CJ
DRESSING REGULAR LOWER BODY CLOTHING: A LITTLE
MOBILITY SCORE: 17
MOVING FROM LYING ON BACK TO SITTING ON SIDE OF FLAT BED: A LITTLE
TOILETING: A LITTLE
TURNING FROM BACK TO SIDE WHILE IN FLAT BAD: A LITTLE
TOILETING: A LITTLE
TURNING FROM BACK TO SIDE WHILE IN FLAT BAD: A LITTLE
MOVING TO AND FROM BED TO CHAIR: A LITTLE
DRESSING REGULAR LOWER BODY CLOTHING: A LITTLE
HELP NEEDED FOR BATHING: A LITTLE
DRESSING REGULAR UPPER BODY CLOTHING: A LITTLE
SUGGESTED CMS G CODE MODIFIER DAILY ACTIVITY: CK
DAILY ACTIVITIY SCORE: 21
CLIMB 3 TO 5 STEPS WITH RAILING: A LITTLE
WALKING IN HOSPITAL ROOM: A LITTLE
PERSONAL GROOMING: A LITTLE
SUGGESTED CMS G CODE MODIFIER MOBILITY: CK
MOBILITY SCORE: 19
MOVING TO AND FROM BED TO CHAIR: A LITTLE
STANDING UP FROM CHAIR USING ARMS: A LITTLE
CLIMB 3 TO 5 STEPS WITH RAILING: A LOT
SUGGESTED CMS G CODE MODIFIER MOBILITY: CK
HELP NEEDED FOR BATHING: A LITTLE
MOVING FROM LYING ON BACK TO SITTING ON SIDE OF FLAT BED: A LITTLE
DAILY ACTIVITIY SCORE: 19

## 2022-05-24 ASSESSMENT — GAIT ASSESSMENTS
ASSISTIVE DEVICE: FRONT WHEEL WALKER
GAIT LEVEL OF ASSIST: SUPERVISED
DISTANCE (FEET): 150

## 2022-05-24 ASSESSMENT — ACTIVITIES OF DAILY LIVING (ADL): TOILETING: INDEPENDENT

## 2022-05-24 ASSESSMENT — PAIN DESCRIPTION - PAIN TYPE: TYPE: CHRONIC PAIN

## 2022-05-24 NOTE — PROGRESS NOTES
4 Eyes Skin Assessment Completed by EVAN malone and EVAN miles.    Head WDL  Ears WDL  Nose WDL  Mouth WDL  Neck WDL  Breast/Chest WDL  Shoulder Blades WDL  Spine WDL  (R) Arm/Elbow/Hand WDL  (L) Arm/Elbow/Hand WDL  Abdomen WDL  Groin WDL  Scrotum/Coccyx/Buttocks Blanching  (R) Leg Dry   (L) Leg Dry   (R) Heel/Foot/Toe Dry     (L) Heel/Foot/Toe  Dry           Devices In Places Tele Box, Pulse ox       Interventions In Place N/A    Possible Skin Injury No    Pictures Uploaded Into Epic N/A  Wound Consult Placed N/A  RN Wound Prevention Protocol Ordered No

## 2022-05-24 NOTE — DISCHARGE PLANNING
Care Transition Team Assessment    LSW met with pt at bedside to complete assessment. Pt A&Ox4 and able to verify the information on the face sheet. Pt's physical address is [address] [place] [id number]. Prior to this hospitalization pt was independent with all ADLs and IADLs. Pt manages his own medications and drives himself. Pt reported he recently bought a walker, but does not use any other DME. Pt reported he has a good support system including his daughters who live in [place] and [place].    Pt is retired and receives SSI as well as has savings. Pt has a history of etoh, but declined resources. Pt denies any MH concerns. Pt is unsure at this time whether or not he has an advance directive, no ACP documents on file. Pt confirmed his daughter or granddaughter will be able to provide transportation home upon DC.    Pt is pending PT/OT evals, six clicks are 19/17. LSW spoke with pt about possibly needing HHC. Pt agreeable with sending referral to [facility] if it is recommended.    Addendum @[id number]  [facility] ordered for pt. LSW faxed C choice form to Beatris BASHIR.    Information Source  Orientation Level: Oriented X4  Information Given By: Patient  Informant's Name: Wilfredo Kohler  Who is responsible for making decisions for patient? : Patient    Readmission Evaluation  Is this a readmission?: No    Elopement Risk  Legal Hold: No  Ambulatory or Self Mobile in Wheelchair: Yes  Disoriented: No  Psychiatric Symptoms: None  History of Wandering: No  Elopement this Admit: No  Vocalizing Wanting to Leave: No  Displays Behaviors, Body Language Wanting to Leave: No-Not at Risk for Elopement  Elopement Risk: Not at Risk for Elopement    Interdisciplinary Discharge Planning  Patient or legal guardian wants to designate a caregiver: No  Durable Medical Equipment: Walker    Discharge Preparedness  What is your plan after discharge?: Home health care  What are your discharge supports?: Child  Prior Functional Level:  Ambulatory, Drives Self, Independent with Activities of Daily Living, Independent with Medication Management, Uses Walker  Difficulity with ADLs: None  Difficulity with IADLs: None    Functional Assesment  Prior Functional Level: Ambulatory, Drives Self, Independent with Activities of Daily Living, Independent with Medication Management, Uses Walker    Finances  Financial Barriers to Discharge: No  Prescription Coverage: Yes    Vision / Hearing Impairment  Vision Impairment : No  Hearing Impairment : No    Advance Directive  Advance Directive?: None    Domestic Abuse  Have you ever been the victim of abuse or violence?: No  Physical Abuse or Sexual Abuse: No  Verbal Abuse or Emotional Abuse: No  Possible Abuse/Neglect Reported to:: Not Applicable    Psychological Assessment  History of Substance Abuse: Alcohol  History of Psychiatric Problems: No  Non-compliant with Treatment: No  Newly Diagnosed Illness: No    Discharge Risks or Barriers  Discharge risks or barriers?: Substance abuse, Lives alone, no community support  Patient risk factors: Substance abuse, Lives alone and no community support    Anticipated Discharge Information  Discharge Disposition: D/T to home under HHA care in anticipation of covered skilled care (06)  Discharge Address: [address] [place] [id number]

## 2022-05-24 NOTE — THERAPY
"Occupational Therapy   Initial Evaluation     Patient Name: Wilfredo Kohler Jr.  Age:  77 y.o., Sex:  male  Medical Record #: [id number]  Today's Date: 5/24/2022     Precautions  Precautions: (P) Fall Risk    Assessment  Patient is 77 y.o. male with a diagnosis who presents to acute for ALOC, found to have A-fib and RVR. PMH includes prostate CA, HTN, and ETOH. Pt performed BADLs at SPV/SBA level, appears close to functional baseline. Family present and observed most of the evaluation. Ed/trained them on home health recommendation and addressed concerns. No further acute OT needs at this time.     Plan    Recommend Occupational Therapy DC needs only    DC Equipment Recommendations: (P) Tub / Shower Seat (HH can assist w/ retrieving as needed)  Discharge Recommendations: (P) Recommend home health for continued occupational therapy services     Subjective    \"Oh I didn't know you were coming\"     Objective       05/24/22 0821   Charge Group   OT Evaluation OT Evaluation Low   Total Time Spent   OT Time Spent Yes   OT Evaluation (Minutes) 25   Initial Contact Note    Initial Contact Note Order Received and Verified, Occupational Therapy Evaluation in Progress with Full Report to Follow.   Prior Living Situation   Prior Services None   Housing / Facility 2 Story House  (all needs on main floor)   Bathroom Set up Walk In Shower   Equipment Owned Front-Wheel Walker   Lives with - Patient's Self Care Capacity Adult Children   Comments Lives w/ dtr and DANUTA. Dtr present, reports she typically works from home however does travel for work.   Prior Level of ADL Function   Self Feeding Independent   Grooming / Hygiene Independent   Bathing Independent   Dressing Independent   Toileting Independent   Prior Level of IADL Function   Medication Management Independent   Laundry Independent   Kitchen Mobility Requires Assist   Finances Requires Assist   Home Management Requires Assist   Shopping Requires Assist   Prior Level Of " "Mobility Independent Without Device in Home   Driving / Transportation Relatives / Others Provide Transportation   Precautions   Precautions Fall Risk   Vitals   O2 (LPM) 0   O2 Delivery Device None - Room Air   Pain 0 - 10 Group   Therapist Pain Assessment Post Activity Pain Same as Prior to Activity;Nurse Notified  (no c/o pain during session)   Cognition    Cognition / Consciousness WDL   Level of Consciousness Alert   Comments pleasent and cooperative, reports he had a few \"fuzzy days\" appears to be close to cognitive baseline, Family present   Active ROM Upper Body   Active ROM Upper Body  WDL   Strength Upper Body   Upper Body Strength  WDL   Coordination Upper Body   Coordination WDL   Balance Assessment   Sitting Balance (Static) Fair +   Sitting Balance (Dynamic) Fair   Standing Balance (Static) Fair   Standing Balance (Dynamic) Fair   Weight Shift Sitting Good   Weight Shift Standing Fair   Comments w/ FWW, no amber LOB   Bed Mobility    Supine to Sit Supervised   Sit to Supine   (NT in chair post)   Scooting Supervised   ADL Assessment   Grooming Supervision;Standing   Lower Body Dressing Supervision   Toileting Supervision   How much help from another person does the patient currently need...   Putting on and taking off regular lower body clothing? 3   Bathing (including washing, rinsing, and drying)? 3   Toileting, which includes using a toilet, bedpan, or urinal? 3   Putting on and taking off regular upper body clothing? 4   Taking care of personal grooming such as brushing teeth? 4   Eating meals? 4   6 Clicks Daily Activity Score 21   Functional Mobility   Sit to Stand Standby Assist   Bed, Chair, Wheelchair Transfer Standby Assist   Toilet Transfers Standby Assist   Mobility within room w/ FWW   Activity Tolerance   Sitting in Chair 10+ min (up post)   Sitting Edge of Bed 5 min   Standing 8 min   Comments pt c/o fatigue post   Short Term Goals   Short Term Goal # 1 Pt will have no further acute OT " needs by time of DC   Education Group   Role of Occupational Therapist Patient Response Patient;Acceptance;Explanation;Demonstration;Verbal Demonstration;Action Demonstration   Problem List   Problem List Decreased Active Daily Living Skills   Anticipated Discharge Equipment and Recommendations   DC Equipment Recommendations Tub / Shower Seat  (HH can assist w/ retrieving as needed)   Discharge Recommendations Recommend home health for continued occupational therapy services   Interdisciplinary Plan of Care Collaboration   IDT Collaboration with  Nursing   Patient Position at End of Therapy Seated;Chair Alarm On;Call Light within Reach;Tray Table within Reach;Phone within Reach;Family / Friend in Room   Collaboration Comments report given   Session Information   Date / Session Number  5/24, DC needs

## 2022-05-24 NOTE — THERAPY
Physical Therapy   Initial Evaluation     Patient Name: Wilfredo Kohler Jr.  Age:  77 y.o., Sex:  male  Medical Record #: [id number]  Today's Date: 5/24/2022     Assessment  Patient is a 77 y.o. male presenting with ALOC and admitted with a fib with RVR. Hx includes prostate CA, HTN, and alc use. Pt seen for PT evaluation at this time. Pt completed all mobility without assist, ambulated with FWW, no LOB, and completed STS from EOB and from low chair on his own. Educated on home safety techniques including proper hand placement for STS transfer and safe use of FWW. Discussed benefits of HHPT with pt and daughters with recommendation at DC. Encouraged pt to sit up in chair during the day and ambulate in halls with nursing to prevent deconditioning. Patient will not be actively followed for physical therapy services at this time, however may be seen if requested by physician for 1 more visit within 30 days to address any discharge or equipment needs.    Plan  Recommend Physical Therapy for Evaluation only   DC Equipment Recommendations: None  Discharge Recommendations: Recommend home health for continued physical therapy services          05/24/22 0853   Prior Living Situation   Prior Services None   Housing / Facility 2 Story House   Steps Into Home 1   Steps In Home pt stays on main level, dtr works upstairs   Bathroom Set up Walk In Shower   Equipment Owned Front-Wheel Walker   Lives with -  Adult Children   Comments lives with dtr and DANUTA. dtr present and reports works from home, sometimes travels.   Prior Level of Functional Mobility   Bed Mobility Independent   Transfer Status Independent   Ambulation Independent   Distance Ambulation (Feet) limited community   Assistive Devices Used None (though just obtained a FWW he was planning to use)   Cognition    Level of Consciousness Alert   Comments pleasant, cooperative. family present and supportive.   Balance Assessment   Sitting Balance (Static) Good    Sitting Balance (Dynamic) Good   Standing Balance (Static) Fair +   Standing Balance (Dynamic) Fair   Weight Shift Sitting Fair   Weight Shift Standing Fair   Comments standing with FWW   Gait Analysis   Gait Level Of Assist Supervised   Assistive Device Front Wheel Walker   Distance (Feet) 150   Weight Bearing Status no restrictions   Comments cues for upright posture with FWW, good return demo. no LOB   Bed Mobility    Supine to Sit Supervised   Scooting Supervised   Functional Mobility   Sit to Stand Supervised   Bed, Chair, WC Transfer Supervised

## 2022-05-24 NOTE — FACE TO FACE
Face to Face Supporting Documentation - Home Health    The encounter with this patient was in whole or in part the primary reason for home health admission.    Date of encounter:   Patient:                    MRN:                       YOB: 2023  Wilfredo Kohler Jr.  [id number]  1945     Home health to see patient for:  Skilled Nursing care for assessment, interventions & education, Physical Therapy evaluation and treatment and Occupational therapy evaluation and treatment    Skilled need for:  Recent Deterioration of Health Status debility    Skilled nursing interventions to include:  Comment: n/a    Homebound status evidenced by:  Need the aid of supportive devices such as crutches, canes, wheelchairs or walkers or Needs the assistance of another person in order to leave the home. Leaving home requires a considerable and taxing effort. There is a normal inability to leave the home.    Community Physician to provide follow up care: CARO Riddle     Optional Interventions? Yes, Details: home physical therapy safety assessment      I certify the face to face encounter for this home health care referral meets the CMS requirements and the encounter/clinical assessment with the patient was, in whole, or in part, for the medical condition(s) listed above, which is the primary reason for home health care. Based on my clinical findings: the service(s) are medically necessary, support the need for home health care, and the homebound criteria are met.  I certify that this patient has had a face to face encounter by myself.  Robert Wellington M.D. - NPI: [id number]

## 2022-05-24 NOTE — DISCHARGE SUMMARY
Discharge Summary    CHIEF COMPLAINT ON ADMISSION  Chief Complaint   Patient presents with   • ALOC     Family called EMS from home with concern for aloc and fatigue       Reason for Admission  ems     Admission Date  5/21/2022    CODE STATUS  Full Code    HPI & HOSPITAL COURSE    Mr Kohler is a 77 year-old male with history of stage 4 prostate cancer, obstructive sleep apnea declines treatment, alcohol abuse, hypertension, hyperlipidemia, who presented for chief complaint of fatigue and altered mentation, found to be in atrial fibrillation with rapid ventricular rate. Cardiology was consulted and the patient was initially admitted to the ICU on an esmolol drip for rate control. He did spontaneously convert to sinus rhythm. He was started on apixaban for anticoagulation. Echocardiogram showed dilated right atrium. He does have sleep apnea but declines CPAP due to discomfort. Per cardiology recommendation he was started on amiodarone load with dosing of 400mg BID for 1 week then 400mg daily for a week and then 200mg indefinitely. His metoprolol was also continued. He will need followup thyroid, liver, and PFT's every 6 months. He was encouraged to abstain from further alcohol use. By discharge his heart rate was stable in the 60s-70s and his mentation was back to his baseline per daughter. He was assessed by physical and occupational therapy and determined to benefit from home health which was ordered.    Addendum: Due to insurance issues he was switched to rivaroxaban on discharge    In regards to his metastatic cancer, he and family are aware of hospice but are not at the stage yet, they plan to undergo further imaging and possible radiation therapy and plan to follow-up with their oncologist after discharge.    Therefore, he is discharged in fair and stable condition to home with close outpatient follow-up.    The patient met 2-midnight criteria for an inpatient stay at the time of discharge.    Discharge  Date  5/24/2022    Exam    Physical Exam  Constitutional:       General: He is not in acute distress.     Appearance: He is obese.   HENT:      Head: Normocephalic and atraumatic.      Right Ear: External ear normal.      Left Ear: External ear normal.      Mouth/Throat:      Mouth: Mucous membranes are moist.      Pharynx: Oropharynx is clear.   Eyes:      Extraocular Movements: Extraocular movements intact.      Pupils: Pupils are equal, round, and reactive to light.   Cardiovascular:      Heart sounds: No murmur heard.  Pulmonary:      Effort: Pulmonary effort is normal.      Breath sounds: Normal breath sounds. No wheezing.   Abdominal:      General: Abdomen is flat.      Palpations: Abdomen is soft.   Musculoskeletal:      Cervical back: Normal range of motion. No rigidity.      Right lower leg: No edema.      Left lower leg: No edema.   Skin:     Capillary Refill: Capillary refill takes less than 2 seconds.   Neurological:      General: No focal deficit present.      Mental Status: He is alert.      Comments: AOx2         FOLLOW UP ITEMS POST DISCHARGE  Follow up with cardiology regarding atrial fibrillation  Follow up with oncologists regarding stage 4 prostate cancer    DISCHARGE DIAGNOSES  Principal Problem:    Atrial fibrillation with rapid ventricular response (HCC) POA: Unknown  Active Problems:    Hyperlipidemia POA: Yes    Hypertension POA: Yes    Pulmonary hypertension (HCC) POA: Yes    LINDY (obstructive sleep apnea) POA: Unknown    Hypomagnesemia POA: Unknown    Hyponatremia POA: Unknown    Acute encephalopathy POA: Unknown    Alcohol abuse POA: Unknown    Hypoalbuminemia POA: Unknown  Resolved Problems:    * No resolved hospital problems. *      FOLLOW UP  No future appointments.  No follow-up provider specified.    MEDICATIONS ON DISCHARGE     Medication List      START taking these medications      Instructions   amiodarone 200 MG Tabs  Commonly known as: Pacerone   Take 400mg twice daily for 5.5  days, then take 400mg daily for 7 days, then take 200mg daily     magnesium oxide 400 (240 Mg) MG Tabs   Take 1 Tablet by mouth every day.  Dose: 400 mg     rivaroxaban 20 MG Tabs tablet  Commonly known as: XARELTO   Take 1 Tablet by mouth with dinner.  Dose: 20 mg        CONTINUE taking these medications      Instructions   allopurinol 300 MG Tabs  Commonly known as: ZYLOPRIM   Take 1 Tablet by mouth every day.  Dose: 300 mg     FOLIC ACID PO   Take 1 Tablet by mouth every day.  Dose: 1 Tablet     furosemide 20 MG Tabs  Commonly known as: LASIX   Take 20 mg by mouth 1 time a day as needed (Swelling).  Dose: 20 mg     HYDROcodone-acetaminophen 5-325 MG Tabs per tablet  Commonly known as: NORCO   Take 0.5-1 Tablets by mouth every four hours as needed. Indications: Pain  Dose: 0.5-1 Tablet     losartan 100 MG Tabs  Commonly known as: COZAAR   Take 1 Tablet by mouth every day.  Dose: 100 mg     metoprolol tartrate 100 MG Tabs  Commonly known as: LOPRESSOR   Take 1 Tablet by mouth 2 times a day.  Dose: 100 mg     potassium chloride ER 10 MEQ tablet  Commonly known as: KLOR-CON   Take 1 tablet by mouth once daily     rosuvastatin 5 MG Tabs  Commonly known as: CRESTOR   Take 1 Tablet by mouth every day.  Dose: 5 mg     TURMERIC PO   Take 1 Tablet by mouth every day.  Dose: 1 Tablet     VITAMIN B12 PO   Take 1 Tablet by mouth every day.  Dose: 1 Tablet     Vitamin D3 50 MCG (2000 UT) Tabs   Take 2,000 Units by mouth every day.  Dose: 2,000 Units            Allergies  No Known Allergies    DIET  Orders Placed This Encounter   Procedures   • Diet Order Diet: Cardiac; Nutrient modifications: (optional): High Protein     Standing Status:   Standing     Number of Occurrences:   1     Order Specific Question:   Diet:     Answer:   Cardiac [6]     Order Specific Question:   Nutrient modifications: (optional)     Answer:   High Protein [4]       ACTIVITY  As tolerated.  Weight bearing as  tolerated    CONSULTATIONS  Cardiology    PROCEDURES  N/a    LABORATORY  Lab Results   Component Value Date    SODIUM 128 (L) 05/23/2022    POTASSIUM 4.0 05/23/2022    CHLORIDE 98 05/23/2022    CO2 18 (L) 05/23/2022    GLUCOSE 115 (H) 05/23/2022    BUN 20 05/23/2022    CREATININE 0.66 05/23/2022        Lab Results   Component Value Date    WBC 2.7 (L) 05/22/2022    HEMOGLOBIN 9.6 (L) 05/22/2022    HEMATOCRIT 28.3 (L) 05/22/2022    PLATELETCT 102 (L) 05/22/2022        Total time of the discharge process exceeds 60 minutes.

## 2022-05-24 NOTE — DISCHARGE PLANNING
Received Choice form at 2019  Agency/Facility Name: [facility]    Referral sent per Choice form @ [phone]

## 2022-05-24 NOTE — PROGRESS NOTES
Report received. Pain level 0 out of 10. Denies SOB. Fall risk interventions in place, bed in low position, and bed alarm on. Assessment completed.

## 2022-05-24 NOTE — PROGRESS NOTES
SBAR report called to T7 RN, all questions answered. Patient transported up with personal belongings, and daughter at bedside.

## 2022-05-24 NOTE — DISCHARGE INSTRUCTIONS
Discharge Instructions    Discharged to home by car with relative. Discharged via wheelchair, hospital escort: Yes.  Special equipment needed: Not Applicable    Be sure to schedule a follow-up appointment with your primary care doctor or any specialists as instructed.     Discharge Plan:   Diet Plan: Discussed  Activity Level: Discussed  Confirmed Follow up Appointment: Patient to Call and Schedule Appointment  Confirmed Symptoms Management: Discussed  Medication Reconciliation Updated: Yes    I understand that a diet low in cholesterol, fat, and sodium is recommended for good health. Unless I have been given specific instructions below for another diet, I accept this instruction as my diet prescription.     Special Instructions: None    Is patient discharged on Warfarin / Coumadin?   No     Depression / Suicide Risk    As you are discharged from this [facility] facility, it is important to learn how to keep safe from harming yourself.    Recognize the warning signs:  Abrupt changes in personality, positive or negative- including increase in energy   Giving away possessions  Change in eating patterns- significant weight changes-  positive or negative  Change in sleeping patterns- unable to sleep or sleeping all the time   Unwillingness or inability to communicate  Depression  Unusual sadness, discouragement and loneliness  Talk of wanting to die  Neglect of personal appearance   Rebelliousness- reckless behavior  Withdrawal from people/activities they love  Confusion- inability to concentrate     If you or a loved one observes any of these behaviors or has concerns about self-harm, here's what you can do:  Talk about it- your feelings and reasons for harming yourself  Remove any means that you might use to hurt yourself (examples: pills, rope, extension cords, firearm)  Get professional help from the community (Mental Health, Substance Abuse, psychological counseling)  Do not be alone:Call your Safe Contact-  someone whom you trust who will be there for you.  Call your local CRISIS HOTLINE 040-5111 or [phone]  Call your local Children's Mobile Crisis Response Team [place] (137) 961-0275 or [employer]ia [employer] [place]  Call the toll free National Suicide Prevention Hotlines   National Suicide Prevention Lifeline 083-039-UGVD (9375)  [facility] 800-SUICIDE (591-7029)        Atrial Fibrillation    Atrial fibrillation is a type of heartbeat that is irregular or fast (rapid). If you have this condition, your heart beats without any order. This makes it hard for your heart to pump blood in a normal way. Having this condition gives you more risk for stroke, heart failure, and other heart problems.  Atrial fibrillation may start all of a sudden and then stop on its own, or it may become a long-lasting problem.  What are the causes?  This condition may be caused by heart conditions, such as:  High blood pressure.  Heart failure.  Heart valve disease.  Heart surgery.  Other causes include:  Pneumonia.  Obstructive sleep apnea.  Lung cancer.  Thyroid disease.  Drinking too much alcohol.  Sometimes the cause is not known.  What increases the risk?  You are more likely to develop this condition if:  You smoke.  You are older.  You have diabetes.  You are overweight.  You have a family history of this condition.  You exercise often and hard.  What are the signs or symptoms?  Common symptoms of this condition include:  A feeling like your heart is beating very fast.  Chest pain.  Feeling short of breath.  Feeling light-headed or weak.  Getting tired easily.  Follow these instructions at home:  Medicines  Take over-the-counter and prescription medicines only as told by your doctor.  If your doctor gives you a blood-thinning medicine, take it exactly as told. Taking too much of it can cause bleeding. Taking too little of it does not protect you against clots. Clots can cause a stroke.  Lifestyle         Do not use any  "tobacco products. These include cigarettes, chewing tobacco, and e-cigarettes. If you need help quitting, ask your doctor.  Do not drink alcohol.  Do not drink beverages that have caffeine. These include coffee, soda, and tea.  Follow diet instructions as told by your doctor.  Exercise regularly as told by your doctor.  General instructions  If you have a condition that causes breathing to stop for a short period of time (apnea), treat it as told by your doctor.  Keep a healthy weight. Do not use diet pills unless your doctor says they are safe for you. Diet pills may make heart problems worse.  Keep all follow-up visits as told by your doctor. This is important.  Contact a doctor if:  You notice a change in the speed, rhythm, or strength of your heartbeat.  You are taking a blood-thinning medicine and you see more bruising.  You get tired more easily when you move or exercise.  You have a sudden change in weight.  Get help right away if:    You have pain in your chest or your belly (abdomen).  You have trouble breathing.  You have blood in your vomit, poop, or pee (urine).  You have any signs of a stroke. \"BE FAST\" is an easy way to remember the main warning signs:  B - Balance. Signs are dizziness, sudden trouble walking, or loss of balance.  E - Eyes. Signs are trouble seeing or a change in how you see.  F - Face. Signs are sudden weakness or loss of feeling in the face, or the face or eyelid drooping on one side.  A - Arms. Signs are weakness or loss of feeling in an arm. This happens suddenly and usually on one side of the body.  S - Speech. Signs are sudden trouble speaking, slurred speech, or trouble understanding what people say.  T - Time. Time to call emergency services. Write down what time symptoms started.  You have other signs of a stroke, such as:  A sudden, very bad headache with no known cause.  Feeling sick to your stomach (nausea).  Throwing up (vomiting).  Jerky movements you cannot control " (seizure).  These symptoms may be an emergency. Do not wait to see if the symptoms will go away. Get medical help right away. Call your local emergency services (911 in the [place] Do not drive yourself to the hospital.  Summary  Atrial fibrillation is a type of heartbeat that is irregular or fast (rapid).  You are at higher risk of this condition if you smoke, are older, have diabetes, or are overweight.  Follow your doctor's instructions about medicines, diet, exercise, and follow-up visits.  Get help right away if you think that you have signs of a stroke.  This information is not intended to replace advice given to you by your health care provider. Make sure you discuss any questions you have with your health care provider.  Document Released: 09/26/2009 Document Revised: 02/21/2019 Document Reviewed: 02/08/2019  Elsevier Patient Education © 2020 [employer]        Preventing Atrial Fibrillation-Related Stroke    Atrial fibrillation is a common type of irregular or rapid heartbeat (arrhythmia) that greatly increases your risk for a stroke. In atrial fibrillation, the top portions of the heart (atria) beat out of sync with the lower portions of the heart. When the muscles of the atria are tightening in an uncoordinated way (fibrillating), blood can pool in the heart and form clots. If a clot travels to the brain, it can cause a stroke. This type of stroke is preventable. Understanding atrial fibrillation and knowing how to properly manage it can prevent you from having a stroke.  What increases my risk for a stroke?  If you have atrial fibrillation, you may be at increased risk for a stroke if you also:  Have heart failure.  Have high blood pressure.  Are older than age 65.  Have diabetes.  Have a history of vascular disease, such as heart attack or stroke.  Are female.  If you have atrial fibrillation and you also have one or more of those risk factors, talk with your health care provider about treatments that  can prevent a stroke.  Other risk factors for a stroke include:  Smoking.  High cholesterol.  Diabetes.  Being inactive (sedentary lifestyle).  Having a family history of stroke.  Eating a diet that is high in fat, cholesterol, and salt.  What treatments help to manage atrial fibrillation?  The main goals of treatment for atrial fibrillation are to prevent blood clots from forming and to keep your heart beating at a normal rate and rhythm. Treatment may include:  Blood-thinning medicine (anticoagulant) that helps to prevent clots from forming. This medicine also increases the risk of bleeding. Talk with your health care provider about the risks and benefits of taking anticoagulants.  Medicine that slows the heart rate or brings the heart rhythm back to normal.  Electrical cardioversion. This is a procedure that resets the heart's rhythm by delivering a controlled, low-energy shock through your skin to your heart.  An ablation procedure, such as catheter ablation, catheter ablation with pacemaker, or surgical ablation. These procedures destroy the heart tissues that send abnormal signals so that heart rhythms can be improved or made normal. A pacemaker is a device that is placed under the skin to help the heart beat in a regular rhythm.  How can I prevent atrial fibrillation-related stroke?  Medicines  Take over-the-counter and prescription medicines only as told by your health care provider.  If your health care provider prescribed an anticoagulant, take it exactly as told. Taking too much blood-thinning medicine can cause bleeding. If you do not take enough blood-thinning medicine, you will not have the protection that you need against stroke and other problems.  Eating and drinking  Eat healthy foods, including at least 5 servings of fruits and vegetables a day.  Do not drink alcohol.  Do not drink beverages that contain caffeine, such as coffee, soda, and tea.  Follow dietary instructions as told by your health  care provider.  Managing other medical conditions  Manage and be aware of your blood pressure. If you have high blood pressure, follow your treatment plan to keep it in your target range.  Have your cholesterol checked as often as recommended by your health care provider. If you have high cholesterol, follow your treatment plan to lower it and keep it in your target range.  Talk with your health care provider about symptoms to watch for. Some people may not have any symptoms, so it can be hard to know that they have atrial fibrillation. Talk with your health care provider if you experience:  A feeling that your heart is beating rapidly or irregularly.  An irregular pulse.  A feeling of discomfort or pain in your chest.  Shortness of breath.  Sudden light-headedness or weakness.  Tiredness (fatigue) that happens easily during exercise.  If you have obstructive sleep apnea (LINDY), manage your condition as told by your health care provider.  General instructions  Maintain a healthy weight. Do not use diet pills unless your health care provider approves. Diet pills may make heart problems worse.  Exercise regularly. Get at least 30 minutes of activity on most or all days, or as told by your health care provider.  Do not use any products that contain nicotine or tobacco, such as cigarettes and e-cigarettes. If you need help quitting, ask your health care provider.  Do not use drugs, such as cocaine and amphetamines.  Keep all follow-up visits as told by your health care providers. This is important. These include visits with your heart specialist.  Where to find more information  You may find more information about preventing atrial fibrillation-related stroke from:  National Stroke Association (AFib-Stroke Connection): www.stroke.org  Contact a health care provider if:  You notice a change in the rate, rhythm, or strength of your heartbeat.  You have dizziness.  You are taking an anticoagulant and you have more bruises  "than usual.  You tire out more easily when you exercise or do similar activities.  Get help right away if:    You have chest pain.  You have pain in your abdomen.  You experience unusual sweating or weakness.  You take anticoagulants and you:  Have severe headaches or confusion.  Have blood in your vomit, bowel movement, or urine.  Have bleeding that will not stop.  Fall or injure your head.  You have any symptoms of a stroke. \"BE FAST\" is an easy way to remember the main warning signs of a stroke:  B - Balance. Signs are dizziness, trouble walking, or loss of balance.  E - Eyes. Signs are trouble seeing or a sudden change in vision.  F - Face. Signs are sudden weakness or numbness of the face, or the face or eyelid drooping on one side.  A - Arms. Signs are weakness or numbness in an arm. This happens suddenly and usually on one side of the body.  S - Speech. Signs are sudden trouble speaking, slurred speech, or trouble understanding what people say.  T - Time. Time to call emergency services. Write down what time symptoms started.  You have other signs of a stroke, such as:  A sudden, severe headache with no known cause.  Nausea or vomiting.  Seizure.  These symptoms may represent a serious problem that is an emergency. Do not wait to see if the symptoms will go away. Get medical help right away. Call your local emergency services (911 in the [place] Do not drive yourself to the hospital.  Summary  Having atrial fibrillation increases the risk for a stroke. Talk with your health care provider about what symptoms to watch for.  Atrial fibrillation-related stroke is preventable. Proper management of atrial fibrillation can prevent you from having a stroke.  Talk with your health care provider about whether anticoagulant medicine is right for you.  Learn the warning signs of a stroke and remember \"BE FAST.\"  This information is not intended to replace advice given to you by your health care provider. Make sure you " discuss any questions you have with your health care provider.  Document Released: 04/04/2018 Document Revised: 04/13/2020 Document Reviewed: 04/04/2018  Elsevier Patient Education © 2020 [employer]        Amiodarone tablets  What is this medicine?  AMIODARONE (a JUAN oh baudilio correia) is an antiarrhythmic drug. It helps make your heart beat regularly. Because of the side effects caused by this medicine, it is only used when other medicines have not worked. It is usually used for heartbeat problems that may be life threatening.  This medicine may be used for other purposes; ask your health care provider or pharmacist if you have questions.  COMMON BRAND NAME(S): Cordarone, Pacerone  What should I tell my health care provider before I take this medicine?  They need to know if you have any of these conditions:  liver disease  lung disease  other heart problems  thyroid disease  an unusual or allergic reaction to amiodarone, iodine, other medicines, foods, dyes, or preservatives  pregnant or trying to get pregnant  breast-feeding  How should I use this medicine?  Take this medicine by mouth with a glass of water. Follow the directions on the prescription label. You can take this medicine with or without food. However, you should always take it the same way each time. Take your doses at regular intervals. Do not take your medicine more often than directed. Do not stop taking except on the advice of your doctor or health care professional.  A special MedGuide will be given to you by the pharmacist with each prescription and refill. Be sure to read this information carefully each time.  Talk to your pediatrician regarding the use of this medicine in children. Special care may be needed.  Overdosage: If you think you have taken too much of this medicine contact a poison control center or emergency room at once.  NOTE: This medicine is only for you. Do not share this medicine with others.  What if I miss a dose?  If you miss a  dose, take it as soon as you can. If it is almost time for your next dose, take only that dose. Do not take double or extra doses.  What may interact with this medicine?  Do not take this medicine with any of the following medications:  abarelix  apomorphine  arsenic trioxide  certain antibiotics like erythromycin, gemifloxacin, levofloxacin, pentamidine  certain medicines for depression like amoxapine, tricyclic antidepressants  certain medicines for fungal infections like fluconazole, itraconazole, ketoconazole, posaconazole, voriconazole  certain medicines for irregular heart beat like disopyramide, dronedarone, ibutilide, propafenone, sotalol  certain medicines for malaria like chloroquine, halofantrine  cisapride  droperidol  haloperidol  haw[place]orn  maprotiline  methadone  phenothiazines like chlorpromazine, mesoridazine, thioridazine  pimozide  ranolazine  red yeast rice  vardenafil  This medicine may also interact with the following medications:  antiviral medicines for HIV or AIDS  certain medicines for blood pressure, heart disease, irregular heart beat  certain medicines for cholesterol like atorvastatin, cerivastatin, lovastatin, simvastatin  certain medicines for hepatitis C like sofosbuvir and ledipasvir; sofosbuvir  certain medicines for seizures like phenytoin  certain medicines for thyroid problems  certain medicines that treat or prevent blood clots like warfarin  cholestyramine  cimetidine  clopidogrel  cyclosporine  dextromethorphan  diuretics  dofetilide  fentanyl  general anesthetics  grapefruit juice  lidocaine  loratadine  methotrexate  other medicines that prolong the QT interval (cause an abnormal heart rhythm)  procainamide  quinidine  rifabutin, rifampin, or rifapentine  [place]'s Wort  trazodone  ziprasidone  This list may not describe all possible interactions. Give your health care provider a list of all the medicines, herbs, non-prescription drugs, or dietary supplements you use. Also  tell them if you smoke, drink alcohol, or use illegal drugs. Some items may interact with your medicine.  What should I watch for while using this medicine?  Your condition will be monitored closely when you first begin therapy. Often, this drug is first started in a hospital or other monitored health care setting. Once you are on maintenance therapy, visit your doctor or health care professional for regular checks on your progress. Because your condition and use of this medicine carry some risk, it is a good idea to carry an identification card, necklace or bracelet with details of your condition, medications, and doctor or health care professional.  You may get drowsy or dizzy. Do not drive, use machinery, or do anything that needs mental alertness until you know how this medicine affects you. Do not stand or sit up quickly, especially if you are an older patient. This reduces the risk of dizzy or fainting spells.  This medicine can make you more sensitive to the sun. Keep out of the sun. If you cannot avoid being in the sun, wear protective clothing and use sunscreen. Do not use sun lamps or tanning beds/booths.  You should have regular eye exams before and during treatment. Call your doctor if you have blurred vision, see halos, or your eyes become sensitive to light. Your eyes may get dry. It may be helpful to use a lubricating eye solution or artificial tears solution.  If you are going to have surgery or a procedure that requires contrast dyes, tell your doctor or health care professional that you are taking this medicine.  What side effects may I notice from receiving this medicine?  Side effects that you should report to your doctor or health care professional as soon as possible:  allergic reactions like skin rash, itching or hives, swelling of the face, lips, or tongue  blue-gray coloring of the skin  blurred vision, seeing blue green halos, increased sensitivity of the eyes to light  breathing  problems  chest pain  dark urine  fast, irregular heartbeat  feeling faint or light-headed  intolerance to heat or cold  nausea or vomiting  pain and swelling of the scrotum  pain, tingling, numbness in feet, hands  redness, blistering, peeling or loosening of the skin, including inside the mouth  spitting up blood  stomach pain  sweating  unusual or uncontrolled movements of body  unusually weak or tired  weight gain or loss  yellowing of the eyes or skin  Side effects that usually do not require medical attention (report to your doctor or health care professional if they continue or are bothersome):  change in sex drive or performance  constipation  dizziness  headache  loss of appetite  trouble sleeping  This list may not describe all possible side effects. Call your doctor for medical advice about side effects. You may report side effects to FDA at 8-887-ABA-3888.  Where should I keep my medicine?  Keep out of the reach of children.  Store at room temperature between 20 and 25 degrees C (68 and 77 degrees F). Protect from light. Keep container tightly closed. Throw away any unused medicine after the expiration date.  NOTE: This sheet is a summary. It may not cover all possible information. If you have questions about this medicine, talk to your doctor, pharmacist, or health care provider.  © 2020 Elsevier/[employer] (2019-11-20 13:44:04)        Apixaban oral tablets  What is this medicine?  APIXABAN (a PIX a ban) is an anticoagulant (blood thinner). It is used to lower the chance of stroke in people with a medical condition called atrial fibrillation. It is also used to treat or prevent blood clots in the lungs or in the veins.  This medicine may be used for other purposes; ask your health care provider or pharmacist if you have questions.  COMMON BRAND NAME(S): Eliquis  What should I tell my health care provider before I take this medicine?  They need to know if you have any of these  conditions:  antiphospholipid antibody syndrome  bleeding disorders  bleeding in the brain  blood in your stools (black or tarry stools) or if you have blood in your vomit  history of blood clots  history of stomach bleeding  kidney disease  liver disease  mechanical heart valve  an unusual or allergic reaction to apixaban, other medicines, foods, dyes, or preservatives  pregnant or trying to get pregnant  breast-feeding  How should I use this medicine?  Take this medicine by mouth with a glass of water. Follow the directions on the prescription label. You can take it with or without food. If it upsets your stomach, take it with food. Take your medicine at regular intervals. Do not take it more often than directed. Do not stop taking except on your doctor's advice. Stopping this medicine may increase your risk of a blood clot. Be sure to refill your prescription before you run out of medicine.  Talk to your pediatrician regarding the use of this medicine in children. Special care may be needed.  Overdosage: If you think you have taken too much of this medicine contact a poison control center or emergency room at once.  NOTE: This medicine is only for you. Do not share this medicine with others.  What if I miss a dose?  If you miss a dose, take it as soon as you can. If it is almost time for your next dose, take only that dose. Do not take double or extra doses.  What may interact with this medicine?  This medicine may interact with the following:  aspirin and aspirin-like medicines  certain medicines for fungal infections like ketoconazole and itraconazole  certain medicines for seizures like carbamazepine and phenytoin  certain medicines that treat or prevent blood clots like warfarin, enoxaparin, and dalteparin  clarithromycin  NSAIDs, medicines for pain and inflammation, like ibuprofen or naproxen  rifampin  ritonavir  [place]'s wort  This list may not describe all possible interactions. Give your health care  provider a list of all the medicines, herbs, non-prescription drugs, or dietary supplements you use. Also tell them if you smoke, drink alcohol, or use illegal drugs. Some items may interact with your medicine.  What should I watch for while using this medicine?  Visit your healthcare professional for regular checks on your progress. You may need blood work done while you are taking this medicine. Your condition will be monitored carefully while you are receiving this medicine. It is important not to miss any appointments.  Avoid sports and activities that might cause injury while you are using this medicine. Severe falls or injuries can cause unseen bleeding. Be careful when using sharp tools or knives. Consider using an electric razor. Take special care brushing or flossing your teeth. Report any injuries, bruising, or red spots on the skin to your healthcare professional.  If you are going to need surgery or other procedure, tell your healthcare professional that you are taking this medicine.  Wear a medical ID bracelet or chain. Carry a card that describes your disease and details of your medicine and dosage times.  What side effects may I notice from receiving this medicine?  Side effects that you should report to your doctor or health care professional as soon as possible:  allergic reactions like skin rash, itching or hives, swelling of the face, lips, or tongue  signs and symptoms of bleeding such as bloody or black, tarry stools; red or dark-brown urine; spitting up blood or brown material that looks like coffee grounds; red spots on the skin; unusual bruising or bleeding from the eye, gums, or nose  signs and symptoms of a blood clot such as chest pain; shortness of breath; pain, swelling, or warmth in the leg  signs and symptoms of a stroke such as changes in vision; confusion; trouble speaking or understanding; severe headaches; sudden numbness or weakness of the face, arm or leg; trouble walking;  dizziness; loss of coordination  This list may not describe all possible side effects. Call your doctor for medical advice about side effects. You may report side effects to FDA at 2-798-ZXV-3372.  Where should I keep my medicine?  Keep out of the reach of children.  Store at room temperature between 20 and 25 degrees C (68 and 77 degrees F). Throw away any unused medicine after the expiration date.  NOTE: This sheet is a summary. It may not cover all possible information. If you have questions about this medicine, talk to your doctor, pharmacist, or health care provider.  © 2020 Elsevier/Gold Standard (2019-08-28 17:39:34)          Bleeding Precautions When on Anticoagulant Therapy, Adult  Anticoagulant therapy, also called blood thinner therapy, is medicine that helps to prevent and treat blood clots. The medicine works by stopping blood clots from forming or growing. Blood clots that form in your blood vessels can be dangerous. They can break loose and travel to the heart, lungs, or brain. This increases the risk of a heart attack, stroke, or blocked lung artery (pulmonary embolism).  Anticoagulants also increase the risk of bleeding. Try to protect yourself from cuts and other injuries that can cause bleeding. It is important to take anticoagulants exactly as told by your health care provider.  Why do I need to be on anticoagulant therapy?  You may need this medicine if you are at risk of developing a blood clot. Conditions that increase your risk of a blood clot include:  Being born with heart disease or a heart malformation (congenital heart disease).  Developing heart disease.  Having had surgery, such as valve replacement.  Having had a serious accident or other type of severe injury (trauma).  Having certain types of cancer.  Having certain diseases that can increase blood clotting.  Having a high risk of stroke or heart attack.  Having atrial fibrillation (AF).  What are the common anticoagulant  medicines?  There are several types of anticoagulant medicines. The most common types are:  Medicines that you take by mouth (oral medicines), such as:  Warfarin.  Novel oral anticoagulants (NOACs), such as:  Direct thrombin inhibitors (dabigatran).  Factor Xa inhibitors (apixaban, edoxaban, and rivaroxaban).  Injections, such as:  Unfractionated heparin.  Low molecular weight heparin.  These anticoagulants work in different ways to prevent blood clots. They also have different risks and side effects.  What do I need to remember while on anticoagulant therapy?  Taking anticoagulants  Take your medicine at the same time every day. If you forget to take your medicine, take it as soon as you remember. Do not double your dosage of medicine if you miss a whole day. Take your normal dose and call your health care provider.  Do not stop taking your medicine unless your health care provider approves. Stopping the medicine can increase your risk of developing a blood clot.  Taking other medicines  Take over-the-counter and prescriptions medicines only as told by your health care provider.  Do not take over-the-counter NSAIDs, including aspirin and ibuprofen, while you are on anticoagulant therapy. These medicines increase your risk of dangerous bleeding.  Get approval from your health care provider before you start taking any new medicines, vitamins, or herbal products. Some of these could interfere with your therapy.  General instructions  Keep all follow-up visits as told by your health care provider. This is important.  If you are pregnant or trying to get pregnant, talk with a health care provider about anticoagulants. Some of these medicines are not safe to take during pregnancy.  Tell all health care providers, including your dentist, that you are on anticoagulant therapy. It is especially important to tell providers before you have any surgery, medical procedures, or dental work done.  What precautions should I  take?    Be very careful when using knives, scissors, or other sharp objects.  Use an electric razor instead of a blade.  Do not use toothpicks.  Use a soft-bristled toothbrush. Brush your teeth gently.  Always wear shoes outdoors and wear slippers indoors.  Be careful when cutting your fingernails and toenails.  Place bath mats in the bathroom. If possible, install handrails as well.  Wear gloves while you do yard work.  Wear your seat belt.  Prevent falls by removing loose rugs and extension cords from areas where you walk. Use a cane or walker if you need it.  Avoid constipation by:  Drinking enough fluid to keep your urine clear or pale yellow.  Eating foods that are high in fiber, such as fresh fruits and vegetables, whole grains, and beans.  Limiting foods that are high in fat and processed sugars, such as fried and sweet foods.  Do not play contact sports or participate in other activities that have a high risk for injury.  What other precautions are important if on warfarin therapy?  If you are taking a type of anticoagulant called warfarin, make sure you:  Work with a diet and nutrition specialist (dietitian) to make an eating plan. Do not make any sudden changes to your diet after you have started your eating plan.  Do not drink alcohol. It can interfere with your medicine and increase your risk of an injury that causes bleeding.  Get regular blood tests as told by your health care provider.  What are some questions to ask my health care provider?  Why do I need anticoagulant therapy?  What is the best anticoagulant therapy for my condition?  How long will I need anticoagulant therapy?  What are the side effects of anticoagulant therapy?  When should I take my medicine? What should I do if I forget to take it?  Will I need to have regular blood tests?  Do I need to change my diet? Are there foods or drinks that I should avoid?  What activities are safe for me?  What should I do if I want to get  pregnant?  Contact a health care provider if:  You miss a dose of medicine:  And you are not sure what to do.  For more than one day.  You have:  Menstrual bleeding that is heavier than normal.  Bloody or brown urine.  Easy bruising.  Black and tarry stool or bright red stool.  Side effects from your medicine.  You feel weak or dizzy.  You become pregnant.  Get help right away if:  You have bleeding that will not stop within 20 minutes from:  The nose.  The gums.  A cut on the skin.  You have a severe headache or stomachache.  You vomit or cough up blood.  You fall or hit your head.  Summary  Anticoagulant therapy, also called blood thinner therapy, is medicine that helps to prevent and treat blood clots.  Anticoagulants work in different ways to prevent blood clots. They also have different risks and side effects.  Talk with your health care provider about any precautions that you should take while on anticoagulant therapy.  This information is not intended to replace advice given to you by your health care provider. Make sure you discuss any questions you have with your health care provider.  Document Released: 11/28/2016 Document Revised: 04/08/2020 Document Reviewed: 03/06/2018  Elsevier Patient Education © 2020 Elsevier Inc.

## 2022-05-24 NOTE — PROGRESS NOTES
Per Dr. Wellington, patient okay to discharge with home health still pending. Updated family and patient.  Discharge instructions given to patient at bedside, verbalizes understanding and states plans for follow-up with PCP and Cardiologist as recommended. Individualized instruction and discharge information provided on atrial fib, new and home medication review, post-discharge activity level, bleeding precautions, and worsening of symptoms needing follow-up care. Telemetry monitor/IV cathlon removed, chest port deaccessed. All belongings accounted for, all questions answered at this time. Patient discharged to home with family.

## 2022-05-24 NOTE — CARE PLAN
Problem: Knowledge Deficit - Standard  Goal: Patient and family/care givers will demonstrate understanding of plan of care, disease process/condition, diagnostic tests and medications  Note: Continue to monitor hr and orientation   The patient is Stable - Low risk of patient condition declining or worsening    Shift Goals  Clinical Goals: monitor Heart rhythm  Patient Goals: go home,  Family Goals: NA    Progress made toward(s) clinical / shift goals:  tele monitor     Patient is not progressing towards the following goals:

## 2022-05-25 NOTE — DOCUMENTATION QUERY
[facility]                                                                       Query Response Note      PATIENT:               DAVID DUFF  ACCT #:                  [id number]  MRN:                     [id number]  :                      1945  ADMIT DATE:       2022 2:49 PM  DISCH DATE:        2022 4:15 PM  RESPONDING  PROVIDER #:        [id number]           QUERY TEXT:    Your assistance with confirmation/validation of a documented diagnosis is requested.      Diagnosis: malnutrition    NOTE:  If an appropriate response is not listed below, please respond with a new note.    Thank you.    The patient's Clinical Indicators include:  78yo PMHx of ETOH. In ED found to be in AFib with RVR. Hypoalbuminemia, likely secondary to malnutrition of alcoholism. - Dr. Burr, Hospitalist Note, 22    Per chart review patient is down ~9  lbs (3.8%) in 3 months. No overt signs of muscle or fat wasting noted. Malnutrition Risk: Pt does not meet criteria per ASPEN guidelines. - Dietitian Note, 22    Total protein 5.4, albumin 2.6 - Labs, 22  Prealbumin <3.0 - Labs 22    Risk factor: alcoholism    Treatment: Boost Plus supplement, monitor weight, cardiac diet with high protein    Thank you,  Raul King  Clinical   Connect via [employer]  Options provided:   -- Malnutrition is not confirmed and/or it has been ruled out   -- Malnutrition exists, please specify the severity   -- Unable to provide additional clarity regarding the diagnosis   -- Unable to determine      Query created by: Raul iKng on 2022 9:26 AM    RESPONSE TEXT:    Malnutrition is not confirmed and/or it has been ruled out          Electronically signed by:  ANDREW BAUMANN MD 2022 8:39 PM

## 2022-05-31 ENCOUNTER — TELEMEDICINE (OUTPATIENT)
Dept: MEDICAL GROUP | Facility: LAB | Age: 77
End: 2022-05-31
Payer: MEDICARE

## 2022-05-31 ENCOUNTER — TELEPHONE (OUTPATIENT)
Dept: MEDICAL GROUP | Facility: LAB | Age: 77
End: 2022-05-31

## 2022-05-31 DIAGNOSIS — Z09 HOSPITAL DISCHARGE FOLLOW-UP: ICD-10-CM

## 2022-05-31 DIAGNOSIS — C61 MALIGNANT NEOPLASM OF PROSTATE (HCC): ICD-10-CM

## 2022-05-31 DIAGNOSIS — G47.9 SLEEP DISTURBANCE: ICD-10-CM

## 2022-05-31 DIAGNOSIS — I48.91 ATRIAL FIBRILLATION WITH RAPID VENTRICULAR RESPONSE (HCC): ICD-10-CM

## 2022-05-31 DIAGNOSIS — E87.1 HYPONATREMIA: ICD-10-CM

## 2022-05-31 PROCEDURE — 99214 OFFICE O/P EST MOD 30 MIN: CPT | Mod: 95 | Performed by: NURSE PRACTITIONER

## 2022-05-31 RX ORDER — TRAZODONE HYDROCHLORIDE 50 MG/1
50 TABLET ORAL NIGHTLY
Qty: 30 TABLET | Refills: 3 | Status: SHIPPED | OUTPATIENT
Start: 2022-05-31

## 2022-05-31 NOTE — TELEPHONE ENCOUNTER
----- Message from CARO Riddle sent at 5/31/2022  3:35 PM PDT -----  Please ask if [facility] has samples xarelto for pt.

## 2022-05-31 NOTE — PROGRESS NOTES
Virtual Visit: Established Patient   This visit was conducted via Zoom using secure and encrypted videoconferencing technology.   The patient was in their home in the [facility].    The patient's identity was confirmed and verbal consent was obtained for this virtual visit.    Subjective:   CC:   F/u    HPI:  Wilfredo is a 77 y.o. male presenting for evaluation and management of:    Hospital f/u:   Wilfredo is a 77-year-old established male, meeting virtually today with his daughter Cassandra for hospital follow-up.  Admitted 5/21/2022 b/c of family concern regarding fatigue / AMS - found to be in a.fib with rapid rate and placed in cardiac icu where he converted to NSR.  He was started on a direct oral anticoagulant, amiodarone and metoprolol was continued.  Sodium 128 at d/c but was 135, 3 mo ago.    xarelto is very expensive for pt.      Since d/c from hospital he has had intermittent weakness alternating with energetic days.  Appetite is lower than normal for him.  Denies n/v today, was nauseated sun / mon after breakfast - pepto helped.  Having daily bowel movements.  Denies abdominal pain.  Having some low back pain and has pain medication from oncology but is using advil / tylenol instead.  Daughter checking HR at home - 48 - 72.   Doing PT at home with home health and OT / nursing is coming once per week.  Has a f/u with cardiologist 6/27/2022.   Has f/u Dr. Cohen, oncology, next week for scans / labs.     Has trouble staying asleep at night - awake for hours at a time.     Current medicines (including changes today)  Current Outpatient Medications   Medication Sig Dispense Refill   • amiodarone (PACERONE) 200 MG Tab Take 400mg twice daily for 5.5 days, then take 400mg daily for 7 days, then take 200mg daily 60 Tablet 0   • magnesium oxide 400 (240 Mg) MG Tab Take 1 Tablet by mouth every day. 30 Tablet 0   • rosuvastatin (CRESTOR) 5 MG Tab Take 1 Tablet by mouth every day. 30 Tablet 0   • metoprolol  tartrate (LOPRESSOR) 100 MG Tab Take 1 Tablet by mouth 2 times a day. 60 Tablet 0   • losartan (COZAAR) 100 MG Tab Take 1 Tablet by mouth every day. 90 Tablet 3   • Cholecalciferol (VITAMIN D3) 50 MCG (2000 UT) Tab Take 2,000 Units by mouth every day. 30 Tablet 0   • rivaroxaban (XARELTO) 20 MG Tab tablet Take 1 Tablet by mouth with dinner. 30 Tablet 0   • FOLIC ACID PO Take 1 Tablet by mouth every day.     • furosemide (LASIX) 20 MG Tab Take 20 mg by mouth 1 time a day as needed (Swelling).     • HYDROcodone-acetaminophen (NORCO) 5-325 MG Tab per tablet Take 0.5-1 Tablets by mouth every four hours as needed. Indications: Pain     • potassium chloride ER (KLOR-CON) 10 MEQ tablet Take 1 tablet by mouth once daily (Patient taking differently: Take 10 mEq by mouth every day.) 90 Tablet 0   • Cyanocobalamin (VITAMIN B12 PO) Take 1 Tablet by mouth every day.     • TURMERIC PO Take 1 Tablet by mouth every day.     • allopurinol (ZYLOPRIM) 300 MG Tab Take 1 Tablet by mouth every day. 90 Tablet 3     No current facility-administered medications for this visit.       Patient Active Problem List    Diagnosis Date Noted   • Atrial fibrillation with rapid ventricular response (HCC) 05/21/2022   • LINDY (obstructive sleep apnea) 05/21/2022   • Hypomagnesemia 05/21/2022   • Hyponatremia 05/21/2022   • Acute encephalopathy 05/21/2022   • Alcohol abuse 05/21/2022   • Hypoalbuminemia 05/21/2022   • Pulmonary hypertension (HCC) 10/08/2021   • Benign prostatic hyperplasia 08/31/2021   • Diverticular disease 08/31/2021   • Dyspnea on exertion 08/31/2021   • Gout 08/31/2021   • Hyperlipidemia 08/31/2021   • Hypertension 08/31/2021   • Malignant neoplasm of prostate (HCC) - mets to bone - Dr. Cohen 08/31/2021   • Prediabetes 08/31/2021   • Other constipation 08/31/2021        Objective:     Physical Exam  Gen: NAD  Resp: nonlabored.  Able to speak in full sentences  Psy: pleasant / cooperative.   Neuro:  Alert and oriented x  3    Assessment and Plan:   The following treatment plan was discussed:     1. Hospital discharge follow-up     2. Sleep disturbance  traZODone (DESYREL) 50 MG Tab   3. Malignant neoplasm of prostate (HCC) - mets to bone - Dr. Cohen     4. Atrial fibrillation with rapid ventricular response (HCC)     5. Hyponatremia       Reviewed hospitalization in depth with the patient and his daughter, answered all questions that they had.  We reviewed lab work and discharge summary.  Unfortunately Xarelto is expensive and a prescription for free samples was sent to the [facility].  He is aware of his follow-up with cardiology at the end of June.  He also has a follow-up with oncology next week for scanning and then labs the following week which will recheck his sodium and magnesium levels as well as CBC.  He is doing well with home health.  Trial of trazodone 25 to 50 mg 45 minutes prior to bedtime, allowing 8 hours for sleep as he is bothered by lack of sleep at night.  I encouraged him to use hydrocodone given by his oncologist if he needs this for back pain.  We discussed his heart rate and his daughter will decrease his evening dose of metoprolol to 50 mg if his afternoon heart rates are dropping below 45.  Recommend a follow-up with me within the next few months, sooner with any problems

## 2022-06-02 ENCOUNTER — PATIENT MESSAGE (OUTPATIENT)
Dept: MEDICAL GROUP | Facility: LAB | Age: 77
End: 2022-06-02
Payer: MEDICARE

## 2022-06-02 RX ORDER — METOPROLOL TARTRATE 50 MG/1
50 TABLET, FILM COATED ORAL 2 TIMES DAILY
Qty: 60 TABLET | Refills: 0 | Status: SHIPPED | OUTPATIENT
Start: 2022-06-02

## 2022-06-02 NOTE — PATIENT COMMUNICATION
I advised Mariama of the med change.  She is asking that an Rx for the 50 mg be called in.  She is asking if there are any parameters for holding the med?

## 2022-06-02 NOTE — TELEPHONE ENCOUNTER
Sent Rx for 50 mg metoprolol twice daily but they may use his left over 100s by taking 1/2 twice daily in the meantime.  Should hold metoprolol for heart rates below 50.

## 2022-06-02 NOTE — PATIENT COMMUNICATION
Pt's blood pressure today 98/54 pulse 43.  This is with the decreased dose of Metoprolol.  Tired, dizziness, sob with exertion.    [facility]:  Mariama [phone]

## 2022-06-09 ENCOUNTER — TELEPHONE (OUTPATIENT)
Dept: MEDICAL GROUP | Facility: LAB | Age: 77
End: 2022-06-09
Payer: MEDICARE

## 2022-06-09 NOTE — TELEPHONE ENCOUNTER
"· FMLA paperwork received from [facility] requiring provider signature.     · All appropriate fields completed by Medical Assistant: N/A CMA printed and distributed to MA    · Paperwork placed in \"MA to Provider\" folder/basket. Awaiting provider completion/signature.  "

## 2022-06-10 ENCOUNTER — TELEPHONE (OUTPATIENT)
Dept: MEDICAL GROUP | Facility: LAB | Age: 77
End: 2022-06-10
Payer: MEDICARE

## 2022-06-10 NOTE — TELEPHONE ENCOUNTER
"· Home Health paperwork received from [facility] requiring provider signature.     · All appropriate fields completed by Medical Assistant: N/A CMA printed and distributed to MA    · Paperwork placed in \"MA to Provider\" folder/basket. Awaiting provider completion/signature.   · ayesha  "

## 2022-06-14 ENCOUNTER — TELEPHONE (OUTPATIENT)
Dept: MEDICAL GROUP | Facility: LAB | Age: 77
End: 2022-06-14
Payer: MEDICARE

## 2022-06-14 NOTE — TELEPHONE ENCOUNTER
"· orders paperwork received from [facility] requiring provider signature.     · All appropriate fields completed by Medical Assistant: N/A CMA printed and distributed to MA    · Paperwork placed in \"MA to Provider\" folder/basket. Awaiting provider completion/signature.  "

## 2022-06-24 ENCOUNTER — TELEPHONE (OUTPATIENT)
Dept: MEDICAL GROUP | Facility: LAB | Age: 77
End: 2022-06-24
Payer: MEDICARE

## 2022-06-24 NOTE — TELEPHONE ENCOUNTER
"· Home Health paperwork received from [facility] requiring provider signature.     · All appropriate fields completed by Medical Assistant: N/A CMA printed and distributed to MA    · Paperwork placed in \"MA to Provider\" folder/basket. Awaiting provider completion/signature.  "

## 2023-11-29 ENCOUNTER — PATIENT MESSAGE (OUTPATIENT)
Dept: HEALTH INFORMATION MANAGEMENT | Facility: OTHER | Age: 78
End: 2023-11-29

## 2024-06-19 NOTE — ASSESSMENT & PLAN NOTE
Head CT negative for acute pathology  Suspect acute metabolic encephalopathy  Limit sedatives and mind altering medications  Follow neuro exam   We will continue topical diltiazem as needed for her rectal pain.  Recall colonoscopy set.  Continue H2 blocker as needed for reflux.  All questions addressed.  Follow up in 1 year or sooner if needed.